# Patient Record
Sex: FEMALE | Race: WHITE | NOT HISPANIC OR LATINO | Employment: FULL TIME | URBAN - METROPOLITAN AREA
[De-identification: names, ages, dates, MRNs, and addresses within clinical notes are randomized per-mention and may not be internally consistent; named-entity substitution may affect disease eponyms.]

---

## 2017-12-27 ENCOUNTER — LAB VISIT (OUTPATIENT)
Dept: LAB | Facility: HOSPITAL | Age: 27
End: 2017-12-27
Attending: FAMILY MEDICINE
Payer: COMMERCIAL

## 2017-12-27 ENCOUNTER — PATIENT MESSAGE (OUTPATIENT)
Dept: FAMILY MEDICINE | Facility: CLINIC | Age: 27
End: 2017-12-27

## 2017-12-27 ENCOUNTER — OFFICE VISIT (OUTPATIENT)
Dept: FAMILY MEDICINE | Facility: CLINIC | Age: 27
End: 2017-12-27
Attending: FAMILY MEDICINE
Payer: COMMERCIAL

## 2017-12-27 VITALS
WEIGHT: 151.13 LBS | HEART RATE: 74 BPM | RESPIRATION RATE: 16 BRPM | DIASTOLIC BLOOD PRESSURE: 72 MMHG | BODY MASS INDEX: 22.9 KG/M2 | HEIGHT: 68 IN | OXYGEN SATURATION: 97 % | SYSTOLIC BLOOD PRESSURE: 120 MMHG

## 2017-12-27 DIAGNOSIS — Z00.00 LABORATORY EXAM ORDERED AS PART OF ROUTINE GENERAL MEDICAL EXAMINATION: ICD-10-CM

## 2017-12-27 DIAGNOSIS — Z30.41 ORAL CONTRACEPTIVE USE: ICD-10-CM

## 2017-12-27 DIAGNOSIS — Z00.00 ROUTINE GENERAL MEDICAL EXAMINATION AT A HEALTH CARE FACILITY: Primary | ICD-10-CM

## 2017-12-27 DIAGNOSIS — Z87.440 HISTORY OF RECURRENT UTI (URINARY TRACT INFECTION): ICD-10-CM

## 2017-12-27 LAB
ALBUMIN SERPL BCP-MCNC: 3.4 G/DL
ALP SERPL-CCNC: 37 U/L
ALT SERPL W/O P-5'-P-CCNC: 15 U/L
ANION GAP SERPL CALC-SCNC: 6 MMOL/L
AST SERPL-CCNC: 18 U/L
BASOPHILS # BLD AUTO: 0.02 K/UL
BASOPHILS NFR BLD: 0.3 %
BILIRUB SERPL-MCNC: 0.8 MG/DL
BUN SERPL-MCNC: 11 MG/DL
CALCIUM SERPL-MCNC: 9.2 MG/DL
CHLORIDE SERPL-SCNC: 108 MMOL/L
CHOLEST SERPL-MCNC: 193 MG/DL
CHOLEST/HDLC SERPL: 2.3 {RATIO}
CO2 SERPL-SCNC: 24 MMOL/L
CREAT SERPL-MCNC: 1 MG/DL
DIFFERENTIAL METHOD: NORMAL
EOSINOPHIL # BLD AUTO: 0.1 K/UL
EOSINOPHIL NFR BLD: 0.8 %
ERYTHROCYTE [DISTWIDTH] IN BLOOD BY AUTOMATED COUNT: 12 %
EST. GFR  (AFRICAN AMERICAN): >60 ML/MIN/1.73 M^2
EST. GFR  (NON AFRICAN AMERICAN): >60 ML/MIN/1.73 M^2
GLUCOSE SERPL-MCNC: 90 MG/DL
HCT VFR BLD AUTO: 38.9 %
HDLC SERPL-MCNC: 83 MG/DL
HDLC SERPL: 43 %
HGB BLD-MCNC: 13.4 G/DL
IMM GRANULOCYTES # BLD AUTO: 0.01 K/UL
IMM GRANULOCYTES NFR BLD AUTO: 0.2 %
LDLC SERPL CALC-MCNC: 90.6 MG/DL
LYMPHOCYTES # BLD AUTO: 2.6 K/UL
LYMPHOCYTES NFR BLD: 42.5 %
MCH RBC QN AUTO: 29.3 PG
MCHC RBC AUTO-ENTMCNC: 34.4 G/DL
MCV RBC AUTO: 85 FL
MONOCYTES # BLD AUTO: 0.4 K/UL
MONOCYTES NFR BLD: 7.1 %
NEUTROPHILS # BLD AUTO: 3 K/UL
NEUTROPHILS NFR BLD: 49.1 %
NONHDLC SERPL-MCNC: 110 MG/DL
NRBC BLD-RTO: 0 /100 WBC
PLATELET # BLD AUTO: 283 K/UL
PMV BLD AUTO: 10.2 FL
POTASSIUM SERPL-SCNC: 4.4 MMOL/L
PROT SERPL-MCNC: 6.9 G/DL
RBC # BLD AUTO: 4.57 M/UL
SODIUM SERPL-SCNC: 138 MMOL/L
T4 FREE SERPL-MCNC: 1.05 NG/DL
TRIGL SERPL-MCNC: 97 MG/DL
TSH SERPL DL<=0.005 MIU/L-ACNC: 1.64 UIU/ML
WBC # BLD AUTO: 6.02 K/UL

## 2017-12-27 PROCEDURE — 99999 PR PBB SHADOW E&M-NEW PATIENT-LVL III: CPT | Mod: PBBFAC,,, | Performed by: FAMILY MEDICINE

## 2017-12-27 PROCEDURE — 80061 LIPID PANEL: CPT

## 2017-12-27 PROCEDURE — 85025 COMPLETE CBC W/AUTO DIFF WBC: CPT

## 2017-12-27 PROCEDURE — 36415 COLL VENOUS BLD VENIPUNCTURE: CPT | Mod: PO

## 2017-12-27 PROCEDURE — 84439 ASSAY OF FREE THYROXINE: CPT

## 2017-12-27 PROCEDURE — 99385 PREV VISIT NEW AGE 18-39: CPT | Mod: S$GLB,,, | Performed by: FAMILY MEDICINE

## 2017-12-27 PROCEDURE — 80053 COMPREHEN METABOLIC PANEL: CPT

## 2017-12-27 PROCEDURE — 84443 ASSAY THYROID STIM HORMONE: CPT

## 2017-12-27 RX ORDER — ALPRAZOLAM 0.25 MG/1
0.25 TABLET ORAL 3 TIMES DAILY PRN
Qty: 10 TABLET | Refills: 0 | Status: SHIPPED | OUTPATIENT
Start: 2017-12-27 | End: 2020-12-13

## 2017-12-27 RX ORDER — NITROFURANTOIN 25; 75 MG/1; MG/1
1 CAPSULE ORAL DAILY PRN
COMMUNITY
Start: 2017-12-06 | End: 2020-12-13

## 2017-12-27 NOTE — PATIENT INSTRUCTIONS
Your test results will be communicated to you via : [facility], Telephone or Letter.   If you have not received your test results in one week, please contact the clinic at [phone].

## 2018-01-24 ENCOUNTER — TELEPHONE (OUTPATIENT)
Dept: FAMILY MEDICINE | Facility: CLINIC | Age: 28
End: 2018-01-24

## 2018-01-24 RX ORDER — SULFAMETHOXAZOLE AND TRIMETHOPRIM 800; 160 MG/1; MG/1
1 TABLET ORAL 2 TIMES DAILY
Qty: 20 TABLET | Refills: 0 | Status: SHIPPED | OUTPATIENT
Start: 2018-01-24 | End: 2020-12-13

## 2018-01-24 NOTE — TELEPHONE ENCOUNTER
----- Message from Apryl Heaton sent at 1/24/2018 12:02 PM CST -----  Contact: pt  x_  1st Request  _  2nd Request  _  3rd Request    Who: BREANNA WELLINGTON [[id number]]    Why: Patient would like to speak with staff in reference to a bump on her nose that may be a staff infection. Pt states she's getting  on Saturday and needs an appt ASAP. Offered a 2/1 appt but the pt is getting  on Saturday.     What Number to Call Back:[phone]    When to Expect a call back: (Within 24 hours)    Please return the call at earliest convenience. Thanks!

## 2018-05-10 ENCOUNTER — PATIENT MESSAGE (OUTPATIENT)
Dept: FAMILY MEDICINE | Facility: CLINIC | Age: 28
End: 2018-05-10

## 2019-02-06 ENCOUNTER — OFFICE VISIT (OUTPATIENT)
Dept: SPORTS MEDICINE | Facility: CLINIC | Age: 29
End: 2019-02-06
Payer: COMMERCIAL

## 2019-02-06 ENCOUNTER — HOSPITAL ENCOUNTER (OUTPATIENT)
Dept: RADIOLOGY | Facility: HOSPITAL | Age: 29
Discharge: HOME | End: 2019-02-06
Attending: ORTHOPAEDIC SURGERY
Payer: COMMERCIAL

## 2019-02-06 VITALS
HEIGHT: 68 IN | DIASTOLIC BLOOD PRESSURE: 84 MMHG | SYSTOLIC BLOOD PRESSURE: 128 MMHG | BODY MASS INDEX: 22.88 KG/M2 | HEART RATE: 73 BPM | WEIGHT: 151 LBS

## 2019-02-06 DIAGNOSIS — M25.562 PAIN IN BOTH KNEES, UNSPECIFIED CHRONICITY: Primary | ICD-10-CM

## 2019-02-06 DIAGNOSIS — M25.562 PAIN IN BOTH KNEES, UNSPECIFIED CHRONICITY: ICD-10-CM

## 2019-02-06 DIAGNOSIS — M25.561 PAIN IN BOTH KNEES, UNSPECIFIED CHRONICITY: Primary | ICD-10-CM

## 2019-02-06 DIAGNOSIS — M25.561 PAIN IN BOTH KNEES, UNSPECIFIED CHRONICITY: ICD-10-CM

## 2019-02-06 PROCEDURE — 73564 X-RAY EXAM KNEE 4 OR MORE: CPT | Mod: 26,50,, | Performed by: RADIOLOGY

## 2019-02-06 PROCEDURE — 99204 OFFICE O/P NEW MOD 45 MIN: CPT | Mod: S$GLB,,, | Performed by: ORTHOPAEDIC SURGERY

## 2019-02-06 PROCEDURE — 99999 PR PBB SHADOW E&M-EST. PATIENT-LVL III: CPT | Mod: PBBFAC,,, | Performed by: ORTHOPAEDIC SURGERY

## 2019-02-06 PROCEDURE — 73564 XR KNEE ORTHO BILAT WITH FLEXION: ICD-10-PCS | Mod: 26,50,, | Performed by: RADIOLOGY

## 2019-02-06 PROCEDURE — 73564 X-RAY EXAM KNEE 4 OR MORE: CPT | Mod: TC,50,FY,PO

## 2019-02-06 PROCEDURE — 99999 PR PBB SHADOW E&M-EST. PATIENT-LVL III: ICD-10-PCS | Mod: PBBFAC,,, | Performed by: ORTHOPAEDIC SURGERY

## 2019-02-06 PROCEDURE — 99204 PR OFFICE/OUTPT VISIT, NEW, LEVL IV, 45-59 MIN: ICD-10-PCS | Mod: S$GLB,,, | Performed by: ORTHOPAEDIC SURGERY

## 2019-02-06 NOTE — PROGRESS NOTES
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 On 2/3/19, she was skiiing in [place] when she fell going down a run and her ski did not dislodge, resulting a possible twisting injury to the left knee. She reports she was able to finish her run, but several minutes had an onset of pain and swelling. Since then, she has been taking ibuprofen and reports a 75% improvement in her symptoms.    - mechanical symptoms, no instability    Review of Systems   Constitution: Negative. Negative for chills, fever and night sweats.   HENT: Negative for congestion and headaches.    Eyes: Negative for blurred vision, left vision loss and right vision loss.   Cardiovascular: Negative for chest pain and syncope.   Respiratory: Negative for cough and shortness of breath.    Endocrine: Negative for polydipsia, polyphagia and polyuria.   Hematologic/Lymphatic: Negative for bleeding problem. Does not bruise/bleed easily.   Skin: Negative for dry skin, itching and rash.   Musculoskeletal: Negative for falls. Positive for knee pain and muscle weakness.   Gastrointestinal: Negative for abdominal pain and bowel incontinence.   Genitourinary: Negative for bladder incontinence and nocturia.   Neurological: Negative for disturbances in coordination, loss of balance and seizures.   Psychiatric/Behavioral: Negative for depression. The patient does not have insomnia.    Allergic/Immunologic: Negative for hives and persistent infections.     PAST MEDICAL HISTORY: History reviewed. No pertinent past medical history.  PAST SURGICAL HISTORY:   Past Surgical History:   Procedure Laterality Date    CERVICAL BIOPSY  W/ LOOP ELECTRODE EXCISION  08/2017     FAMILY HISTORY:   Family History   Problem Relation Age of Onset    No Known Problems Mother     Stroke Father         hemorhagic    Hypertension Father     Epilepsy Sister   "    SOCIAL HISTORY:   Social History     Socioeconomic History    Marital status:      Spouse name: Not on file    Number of children: 0    Years of education: Not on file    Highest education level: Not on file   Social Needs    Financial resource strain: Not on file    Food insecurity - worry: Not on file    Food insecurity - inability: Not on file    Transportation needs - medical: Not on file    Transportation needs - non-medical: Not on file   Occupational History    Occupation: Pharmacist     Comment: [employer]   Tobacco Use    Smoking status: Never Smoker   Substance and Sexual Activity    Alcohol use: Yes     Alcohol/week: 3.6 oz     Types: 4 Cans of beer, 2 Glasses of wine per week    Drug use: No    Sexual activity: Yes   Other Topics Concern    Not on file   Social History Narrative    The patient does exercise regularly (BIW-TIW: cardio).      She is satisfied with weight.    Rates diet as fair.    She does not drink at least 1/2 gallon water daily.    She drinks 2 coffee/tea/caffeine-containing soft drinks daily.    Total sleep time at night is 7 hours.    She works 41 hours per week.    She does wear seat belts.    Hobbies include none.       MEDICATIONS:   Current Outpatient Medications:     ALPRAZolam (XANAX) 0.25 MG tablet, Take 1 tablet (0.25 mg total) by mouth 3 (three) times daily as needed for Anxiety., Disp: 10 tablet, Rfl: 0    mupirocin (BACTROBAN) 2 % ointment, , Disp: , Rfl:     nitrofurantoin, macrocrystal-monohydrate, (MACROBID) 100 MG capsule, Take 1 capsule by mouth daily as needed., Disp: , Rfl:     OCELLA 3-0.03 mg per tablet, Take 1 tablet by mouth once daily., Disp: , Rfl: 11    sulfamethoxazole-trimethoprim 800-160mg (BACTRIM DS) 800-160 mg Tab, Take 1 tablet by mouth 2 (two) times daily., Disp: 20 tablet, Rfl: 0  ALLERGIES:   Review of patient's allergies indicates:   Allergen Reactions    Penicillins        VITAL SIGNS: /84   Pulse 73   Ht 5' 7.5" " "(1.715 m)   Wt 68.5 kg (151 lb)   BMI 23.30 kg/m²      PHYSICAL EXAMINATION  VITAL SIGNS: /84   Pulse 73   Ht 5' 7.5" (1.715 m)   Wt 68.5 kg (151 lb)   BMI 23.30 kg/m²    General:  The patient is alert and oriented x 3.  Mood is pleasant.  Observation of ears, eyes and nose reveal no gross abnormalities.  HEENT: NCAT, sclera nonicteric  Lungs: Respirations are equal and unlabored.    Left KNEE EXAMINATION     OBSERVATION / INSPECTION   Gait:   Antalgic   Alignment:  Neutral   Scars:   None   Muscle atrophy: None  Effusion:  Moderate  Warmth:  None   Discoloration:   none     TENDERNESS / CREPITUS (T / C):          T / C      T / C   Patella   - / -   Lateral joint line   - / -    Peripatellar medial  -  Medial joint line    - / -    Peripatellar lateral +  Medial plica   - / -    Patellar tendon -   Popliteal fossa   - / -    Quad tendon   +   Gastrocnemius   -   Prepatellar Bursa - / -   Quadricep   -   Tibial tubercle  -  Thigh/hamstring  -   Pes anserine/HS -  Fibula    -   ITB   - / -  Tibia     -   Tib/fib joint  - / -  LCL    -     MFC   - / -   MCL: Proximal  -    LFC   - / -    Distal   -          ROM: (* = pain)  PASSIVE   ACTIVE    Left :   5 / 10 / 75   5 / 5 / 65     Right :    5 / 0 / 145   5 / 0 / 145    Patellofemoral examination:  See above noted areas of tenderness.   Patella position    Subluxation / dislocation: Centered           Sup. / Inf;   Normal   Crepitus (PF):    Absent   Patellar Mobility:       Medial-lateral:   Normal    Superior-inferior:  Normal    Inferior tilt   Normal    Patellar tendon:  Normal   Lateral tilt:    Normal   J-sign:     None   Patellofemoral grind:   No pain       MENISCAL SIGNS:     Pain on terminal extension:  +  Pain on terminal flexion:  +  Abdullahis maneuver:  NT  Squat     NT    LIGAMENT EXAMINATION:  ACL / Lachman:  Grade 2B  PCL-Post.  drawer: normal 0 to 2mm  MCL- Valgus:  Grade I  LCL- Varus:  normal 0 to 2mm  Pivot shift: normal (Equal) "   Dial Test: difference c/w other side   At 30° flexion: normal (< 5°)    At 90° flexion: normal (< 5°)   Reverse Pivot Shift:   normal (Equal)     STRENGTH: (* = with pain) PAINFUL SIDE   Quadricep   5/5   Hamstrin/5    EXTREMITY NEURO-VASCULAR EXAMINATION:   Sensation:  Grossly intact to light touch all dermatomal regions.   Motor Function:  Fully intact motor function at hip, knee, foot and ankle    DTRs;  quadriceps and  achilles 2+.  No clonus and downgoing Babinski.    Vascular status:  DP and PT pulses 2+, brisk capillary refill, symmetric.     Other Findings:  Able to perform SLR without difficulty       X-rays:  including standing, weight bearing AP and flexion bilateral knees, lateral and merchant views ordered and images reviewed by me show:  No fracture, dislocation     ASSESSMENT:    Left Knee ACL tear  Left Knee MCL strain  +/- LMT    PLAN:   MRI Left knee  Short runner brace today  Continue ice and NSAID's  Continue working on ROM  If effusion is not improved by next Monday, will schedule patient for knee aspiration.    Will call after MRI is resulted  All questions were answered, pt will contact us for questions or concerns in the interim.

## 2019-02-07 ENCOUNTER — HOSPITAL ENCOUNTER (OUTPATIENT)
Dept: RADIOLOGY | Facility: HOSPITAL | Age: 29
Discharge: HOME | End: 2019-02-07
Attending: STUDENT IN AN ORGANIZED HEALTH CARE EDUCATION/TRAINING PROGRAM
Payer: COMMERCIAL

## 2019-02-07 DIAGNOSIS — M25.562 PAIN IN BOTH KNEES, UNSPECIFIED CHRONICITY: ICD-10-CM

## 2019-02-07 DIAGNOSIS — M25.561 PAIN IN BOTH KNEES, UNSPECIFIED CHRONICITY: ICD-10-CM

## 2019-02-07 PROCEDURE — 73721 MRI KNEE WITHOUT CONTRAST LEFT: ICD-10-PCS | Mod: 26,LT,, | Performed by: RADIOLOGY

## 2019-02-07 PROCEDURE — 73721 MRI JNT OF LWR EXTRE W/O DYE: CPT | Mod: 26,LT,, | Performed by: RADIOLOGY

## 2019-02-07 PROCEDURE — 73721 MRI JNT OF LWR EXTRE W/O DYE: CPT | Mod: TC,LT

## 2019-02-08 ENCOUNTER — TELEPHONE (OUTPATIENT)
Dept: SPORTS MEDICINE | Facility: CLINIC | Age: 29
End: 2019-02-08

## 2019-02-08 DIAGNOSIS — S82.122A CLOSED FRACTURE OF LATERAL PORTION OF LEFT TIBIAL PLATEAU, INITIAL ENCOUNTER: Primary | ICD-10-CM

## 2019-02-08 NOTE — TELEPHONE ENCOUNTER
Called patient to discuss MRI results.  MRI shows an ACL sprain (no tear).  It also shows a lateral tibial plateau with extensive subchondral edema and mild joint depression.    In order to better evaluate the joint depression, we will order a CT scan of the knee. If the joint depression is minimal, we can continue non-operative treatment (short-runner, NWB, ROM as tolerated). If she has joint depression, will potentially need ORIF.    Will call to schedule CT.  In the meantime, patient instructed to maintain NWB with crutches.

## 2019-02-08 NOTE — TELEPHONE ENCOUNTER
----- Message from Michael Joseph Casale, MD sent at 2/8/2019 10:09 AM CST -----  Katina Devine,    I called and spoke with this patient.  Can we get her schedule for a CT scan at her convenience? It is ordered.    Thank you!

## 2019-02-11 ENCOUNTER — HOSPITAL ENCOUNTER (OUTPATIENT)
Dept: RADIOLOGY | Facility: HOSPITAL | Age: 29
Discharge: HOME | End: 2019-02-11
Attending: STUDENT IN AN ORGANIZED HEALTH CARE EDUCATION/TRAINING PROGRAM
Payer: COMMERCIAL

## 2019-02-11 ENCOUNTER — TELEPHONE (OUTPATIENT)
Dept: SPORTS MEDICINE | Facility: CLINIC | Age: 29
End: 2019-02-11

## 2019-02-11 DIAGNOSIS — S82.122A CLOSED FRACTURE OF LATERAL PORTION OF LEFT TIBIAL PLATEAU, INITIAL ENCOUNTER: ICD-10-CM

## 2019-02-11 PROCEDURE — 73700 CT LOWER EXTREMITY W/O DYE: CPT | Mod: 26,LT,, | Performed by: INTERNAL MEDICINE

## 2019-02-11 PROCEDURE — 73700 CT LOWER EXTREMITY W/O DYE: CPT | Mod: TC,LT

## 2019-02-11 PROCEDURE — 73700 CT KNEE WITHOUT CONTRAST LEFT: ICD-10-PCS | Mod: 26,LT,, | Performed by: INTERNAL MEDICINE

## 2019-02-11 NOTE — TELEPHONE ENCOUNTER
Called patient to discuss CT scan results.  CT shows minimal joint depression and displacement.  We have elected to treat this fracture non-operatively.    Plan:  - NWB for at least 8 weeks from date of injury (2/3/19)  - Continue knee brace. Ok to remove and work on ROM as tolerated  - Continue ice and elevation daily  - NSAID's are OK for pain control. Patient declined anything stronger.  - Will refer to PT starting at 4 weeks post-injury  - Will see patient back in the clinic in 4 weeks with new x-rays.    Patient understands and is in agreement with this plan.

## 2019-02-11 NOTE — TELEPHONE ENCOUNTER
----- Message from Michael Joseph Casale, MD sent at 2/11/2019  5:01 PM CST -----  Please make this patient an appointment in 3 weeks for follow-up of tibial plateau fracture (will need new x-rays as well). Thank you!

## 2019-03-13 ENCOUNTER — HOSPITAL ENCOUNTER (OUTPATIENT)
Dept: RADIOLOGY | Facility: HOSPITAL | Age: 29
Discharge: HOME | End: 2019-03-13
Attending: ORTHOPAEDIC SURGERY
Payer: COMMERCIAL

## 2019-03-13 ENCOUNTER — OFFICE VISIT (OUTPATIENT)
Dept: SPORTS MEDICINE | Facility: CLINIC | Age: 29
End: 2019-03-13
Payer: COMMERCIAL

## 2019-03-13 VITALS
HEIGHT: 67 IN | BODY MASS INDEX: 23.7 KG/M2 | DIASTOLIC BLOOD PRESSURE: 74 MMHG | HEART RATE: 84 BPM | SYSTOLIC BLOOD PRESSURE: 129 MMHG | WEIGHT: 151 LBS

## 2019-03-13 DIAGNOSIS — M25.562 ACUTE PAIN OF LEFT KNEE: Primary | ICD-10-CM

## 2019-03-13 DIAGNOSIS — M25.562 LEFT KNEE PAIN, UNSPECIFIED CHRONICITY: ICD-10-CM

## 2019-03-13 PROCEDURE — 99214 OFFICE O/P EST MOD 30 MIN: CPT | Mod: S$GLB,,, | Performed by: ORTHOPAEDIC SURGERY

## 2019-03-13 PROCEDURE — 99999 PR PBB SHADOW E&M-EST. PATIENT-LVL IV: CPT | Mod: PBBFAC,,, | Performed by: ORTHOPAEDIC SURGERY

## 2019-03-13 PROCEDURE — 99214 PR OFFICE/OUTPT VISIT, EST, LEVL IV, 30-39 MIN: ICD-10-PCS | Mod: S$GLB,,, | Performed by: ORTHOPAEDIC SURGERY

## 2019-03-13 PROCEDURE — 99999 PR PBB SHADOW E&M-EST. PATIENT-LVL IV: ICD-10-PCS | Mod: PBBFAC,,, | Performed by: ORTHOPAEDIC SURGERY

## 2019-03-13 PROCEDURE — 73560 XR KNEE 1 OR 2 VIEW LEFT: ICD-10-PCS | Mod: 26,LT,, | Performed by: RADIOLOGY

## 2019-03-13 PROCEDURE — 73560 X-RAY EXAM OF KNEE 1 OR 2: CPT | Mod: 26,LT,, | Performed by: RADIOLOGY

## 2019-03-13 PROCEDURE — 73560 X-RAY EXAM OF KNEE 1 OR 2: CPT | Mod: TC,FY,PO,LT

## 2019-03-13 NOTE — PROGRESS NOTES
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 On 2/3/19, she was skiiing in [place] when she fell going down a run and her ski did not dislodge, resulting a possible twisting injury to the left knee. She reports she was able to finish her run, but several minutes had an onset of pain and swelling.     She notes that she only has small twinges of pain in certain positions but she is otherwise doing well   She has been compliant with her nonweightbearing status and wearing her brace     Review of Systems   Constitution: Negative. Negative for chills, fever and night sweats.   HENT: Negative for congestion and headaches.    Eyes: Negative for blurred vision, left vision loss and right vision loss.   Cardiovascular: Negative for chest pain and syncope.   Respiratory: Negative for cough and shortness of breath.    Endocrine: Negative for polydipsia, polyphagia and polyuria.   Hematologic/Lymphatic: Negative for bleeding problem. Does not bruise/bleed easily.   Skin: Negative for dry skin, itching and rash.   Musculoskeletal: Negative for falls. Positive for knee pain and muscle weakness.   Gastrointestinal: Negative for abdominal pain and bowel incontinence.   Genitourinary: Negative for bladder incontinence and nocturia.   Neurological: Negative for disturbances in coordination, loss of balance and seizures.   Psychiatric/Behavioral: Negative for depression. The patient does not have insomnia.    Allergic/Immunologic: Negative for hives and persistent infections.     PAST MEDICAL HISTORY: History reviewed. No pertinent past medical history.  PAST SURGICAL HISTORY:   Past Surgical History:   Procedure Laterality Date    CERVICAL BIOPSY  W/ LOOP ELECTRODE EXCISION  08/2017     FAMILY HISTORY:   Family History   Problem Relation Age of Onset    No Known Problems Mother     Stroke Father          hemorhagic    Hypertension Father     Epilepsy Sister      SOCIAL HISTORY:   Social History     Socioeconomic History    Marital status:      Spouse name: Not on file    Number of children: 0    Years of education: Not on file    Highest education level: Not on file   Social Needs    Financial resource strain: Not on file    Food insecurity - worry: Not on file    Food insecurity - inability: Not on file    Transportation needs - medical: Not on file    Transportation needs - non-medical: Not on file   Occupational History    Occupation: Pharmacist     Comment: [employer]   Tobacco Use    Smoking status: Never Smoker    Smokeless tobacco: Never Used   Substance and Sexual Activity    Alcohol use: Yes     Alcohol/week: 3.6 oz     Types: 2 Glasses of wine, 4 Cans of beer per week    Drug use: No    Sexual activity: Yes   Other Topics Concern    Not on file   Social History Narrative    The patient does exercise regularly (BIW-TIW: cardio).      She is satisfied with weight.    Rates diet as fair.    She does not drink at least 1/2 gallon water daily.    She drinks 2 coffee/tea/caffeine-containing soft drinks daily.    Total sleep time at night is 7 hours.    She works 41 hours per week.    She does wear seat belts.    Hobbies include none.       MEDICATIONS:   Current Outpatient Medications:     ALPRAZolam (XANAX) 0.25 MG tablet, Take 1 tablet (0.25 mg total) by mouth 3 (three) times daily as needed for Anxiety., Disp: 10 tablet, Rfl: 0    mupirocin (BACTROBAN) 2 % ointment, , Disp: , Rfl:     nitrofurantoin, macrocrystal-monohydrate, (MACROBID) 100 MG capsule, Take 1 capsule by mouth daily as needed., Disp: , Rfl:     OCELLA 3-0.03 mg per tablet, Take 1 tablet by mouth once daily., Disp: , Rfl: 11    sulfamethoxazole-trimethoprim 800-160mg (BACTRIM DS) 800-160 mg Tab, Take 1 tablet by mouth 2 (two) times daily., Disp: 20 tablet, Rfl: 0  ALLERGIES:   Review of patient's allergies indicates:  "  Allergen Reactions    Penicillins        VITAL SIGNS: /74   Pulse 84   Ht 5' 7" (1.702 m)   Wt 68.5 kg (151 lb)   BMI 23.65 kg/m²      PHYSICAL EXAMINATION  VITAL SIGNS: /74   Pulse 84   Ht 5' 7" (1.702 m)   Wt 68.5 kg (151 lb)   BMI 23.65 kg/m²    General:  The patient is alert and oriented x 3.  Mood is pleasant.  Observation of ears, eyes and nose reveal no gross abnormalities.  HEENT: NCAT, sclera nonicteric  Lungs: Respirations are equal and unlabored.    Left KNEE EXAMINATION     OBSERVATION / INSPECTION   Gait:   Antalgic, ambulating on crutches    Alignment:  Neutral   Scars:   None   Muscle atrophy: None  Effusion:  None  Warmth:  None   Discoloration:   none     TENDERNESS / CREPITUS (T / C):          T / C      T / C   Patella   - / -   Lateral joint line   - / -    Peripatellar medial  -  Medial joint line    - / -    Peripatellar lateral -  Medial plica   - / -    Patellar tendon -   Popliteal fossa   - / -    Quad tendon   -   Gastrocnemius   -   Prepatellar Bursa - / -   Quadricep   -   Tibial tubercle  -  Thigh/hamstring  -   Pes anserine/HS -  Fibula    -   ITB   - / -  Tibia     -   Tib/fib joint  - / -  LCL    -     MFC   - / -   MCL: Proximal  -    LFC   - / -    Distal   -          ROM: (* = pain)  PASSIVE   ACTIVE    Left :   5 / 0 / 120   5 / 0 / 120     Right :    5 / 0 / 145   5 / 0 / 145    Patellofemoral examination:  See above noted areas of tenderness.   Patella position    Subluxation / dislocation: Centered           Sup. / Inf;   Normal   Crepitus (PF):    Absent   Patellar Mobility:       Medial-lateral:   Normal    Superior-inferior:  Normal    Inferior tilt   Normal    Patellar tendon:  Normal   Lateral tilt:    Normal   J-sign:     None   Patellofemoral grind:   No pain       MENISCAL SIGNS:     Pain on terminal extension:  -  Pain on terminal flexion:  +  Abdullahis maneuver:  NT  Squat     NT    LIGAMENT EXAMINATION:  ACL / Lachman:  Grade 1A  PCL-Post.  " drawer: normal 0 to 2mm  MCL- Valgus:  Grade I  LCL- Varus:  normal 0 to 2mm  Pivot shift: normal (Equal)   Dial Test: difference c/w other side   At 30° flexion: normal (< 5°)    At 90° flexion: normal (< 5°)   Reverse Pivot Shift:   normal (Equal)     STRENGTH: (* = with pain) PAINFUL SIDE   Quadricep   5/5   Hamstrin/5    EXTREMITY NEURO-VASCULAR EXAMINATION:   Sensation:  Grossly intact to light touch all dermatomal regions.   Motor Function:  Fully intact motor function at hip, knee, foot and ankle    DTRs;  quadriceps and  achilles 2+.  No clonus and downgoing Babinski.    Vascular status:  DP and PT pulses 2+, brisk capillary refill, symmetric.     Other Findings:  Able to perform SLR without difficulty       X-rays:  including standing, weight bearing AP and flexion bilateral knees, lateral and merchant views ordered and images reviewed by me show:  No fracture, dislocation     CT:  Nondisplaced fracture at the anterolateral aspect the lateral tibial plateau with overlying soft tissue edema.  minimal joint depression and displacement    ASSESSMENT:    Nondisplaced tibial plateau fracture , healing     Plan:  - was NWB for at least 8 weeks from date of injury (2/3/19), PWB is ok now  - Continue knee brace. Ok to remove and work on ROM as tolerated  - Continue ice and elevation daily  - NSAID's are OK for pain control. Patient declined anything stronger.  - Begin PT  - Will see patient back in the clinic in 6 weeks with new x-rays.     Patient understands and is in agreement with this plan.

## 2019-03-18 ENCOUNTER — CLINICAL SUPPORT (OUTPATIENT)
Dept: REHABILITATION | Facility: HOSPITAL | Age: 29
End: 2019-03-18
Attending: ORTHOPAEDIC SURGERY
Payer: COMMERCIAL

## 2019-03-18 DIAGNOSIS — M25.562 LEFT KNEE PAIN, UNSPECIFIED CHRONICITY: ICD-10-CM

## 2019-03-18 PROCEDURE — 97161 PT EVAL LOW COMPLEX 20 MIN: CPT

## 2019-03-18 PROCEDURE — 97110 THERAPEUTIC EXERCISES: CPT

## 2019-03-18 NOTE — PLAN OF CARE
[facility]  Physical Therapy Initial Evaluation    Name: Meghan Fontana  Clinic Number: [id number]    Therapy Diagnosis:   Encounter Diagnosis   Name Primary?    Left knee pain, unspecified chronicity      Physician: Emely Dixon MD    Physician Orders: PT Eval and Treat   Medical Diagnosis: M25.562 (ICD-10-CM) - Acute pain of left knee  Date of Surgery: NA    Evaluation Date: 3/18/2019  Authorization Period Expiration: 12/31/2019  Plan of Care Certification Period: 7/31/2019  Visit # / Visits authorized: 1/ 20    Time In: 1300  Time Out: 1400  Total Billable Time: 60 minutes    Precautions: Standard    Subjective   Date of onset: 2/3/2019  History of current condition - Meghan reports to PT with (L) knee pain since a skiiing accident on 2/3/2019. X ray revealed a Tibial Plateau fracture for which she has been NWB. Pt has been compliant with her WB precautions and notes that she is progressing. Denies numbness/tingling at this time.       No past medical history on file.  Meghan Fontana  has a past surgical history that includes Cervical biopsy w/ loop electrode excision (08/2017).    Meghan has a current medication list which includes the following prescription(s): alprazolam, mupirocin, nitrofurantoin (macrocrystal-monohydrate), ocella, and sulfamethoxazole-trimethoprim 800-160mg.    Review of patient's allergies indicates:   Allergen Reactions    Penicillins         Imaging, Left: No fracture dislocation bone destruction or OCD seen.    Prior Therapy: None   Occupation: Pharmacist: Currently Working  Prior Level of Function: Independent without restriction  Current Level of Function: Pain in the AM    Pain:  Current 3/10, worst 3/10, best 2/10   Location: left knee   Description: Aching and Tight  Aggravating Factors: Standing, Extension and Getting out of bed/chair  Easing Factors: rest    Pts goals: Return to skiiing    Objective     Observation: Pt enters wearing knee  brace and using bilateral axillary crutches    Posture: Flexed knee pattern      Gait: NWB    Range of Motion: AROM (PROM):    Knee Left Right   Extension 0 (7) degrees 0 (10) degrees   Flexion 140 (145) degrees 135 (140) degrees       Strength:  Hip Left Right   Flexion 4-/5 4+/5   Abduction 4-/5 4+/5   Adduction 4-/5 4+/5   Extension 4-/5 4+/5     Knee Left Right   Extension 3+/5 5/5   Flexion 3+/5 5/5     Special Tests:    Knee Left   ACL/PCL stress Negative   MCL/LCL stress Negative   End range extension Positive     Joint Mobility:   - Hip WNL    Palpation: (+) TTP  - Lateral JL  - Neurolymphatic bundle (L) Knee    Flexibility:   (+) [place] on (L)          CMS Impairment/Limitation/Restriction for FOTO Knee Survey    Therapist reviewed FOTO scores for Meghan Fontana on 3/18/2019.   FOTO documents entered into [employer] - see Media section.    Limitation Score: 48%  Category: Mobility    Current : CK = at least 40% but < 60% impaired, limited or restricted  Goal: CJ = at least 20% but < 40% impaired, limited or restricted  Discharge:          TREATMENT   Treatment Time In: 1340  Treatment Time Out: 1400  Total Treatment time separate from Evaluation time:20    Meghan received therapeutic exercises to develop strength, endurance, ROM, flexibility and posture for 20 minutes including:  - Quad set  - 3 way SLR  - Prone quad stretch  - Bike x 6 min  - Stair training      Home Exercises and Patient Education Provided    Education provided re: HEP, Dx, POC    Written Home Exercises Provided: .  Exercises were reviewed and Meghan was able to demonstrate them prior to the end of the session.   Pt received a written copy of exercises to perform at home. Meghan demonstrated good  understanding of the education provided.     See EMR under patient instructions for exercises given.   Assessment   Meghan is a 28 y.o. female referred to outpatient Physical Therapy with a medical diagnosis of (L) knee Lateral Tibial Plateau  Fracture. Pt presents with primary impairments including, ROM, strength, gait, balance and pain limiting functional mobility. Pt will remain NWB x 2 additional weeks and will then transition into closed chain activity. She is an excellent candidate for skilled PT tx and should progress nicely with rehabilitation.    Pt prognosis is Excellent.   Pt will benefit from skilled outpatient Physical Therapy to address the deficits stated above and in the chart below, provide pt/family education, and to maximize pt's level of independence.     Plan of care discussed with patient: Yes  Pt's spiritual, cultural and educational needs considered and patient is agreeable to the plan of care and goals as stated below:     Anticipated Barriers for therapy: None    Medical Necessity is demonstrated by the following  History  Co-morbidities and personal factors that may impact the plan of care Co-morbidities:   See Above    Personal Factors:   no deficits     low   Examination  Body Structures and Functions, activity limitations and participation restrictions that may impact the plan of care Body Regions:   lower extremities    Body Systems:    ROM  strength  balance  gait  transfers  edema    Participation Restrictions:   Walking, Squatting, Stairs    Activity limitations:   Learning and applying knowledge  no deficits    Mobility  lifting and carrying objects  walking    Self care  no deficits    Domestic Life  doing house work (cleaning house, washing dishes, laundry)    Life Areas  no deficits    Community and Social Life  recreation and leisure         low   Clinical Presentation stable and uncomplicated low   Decision Making/ Complexity Score: low     Goals:    Short Term Goals (4-6 Weeks):  - Pt will increase (L) knee ROM to match the contralateral side for improved gait mechanics  - Pt will increase (L) quad strength to > 4/5 for stability with standing ADL's  - Decrease gross Pain to < 3/10 with home ambulation without  AD  - Pt independent with HEP to improve tolerance to exercise progressions.     Long Term Goals (8-12 Weeks):    - Pt will increase LLE strength to 5/5 in all planes for stability with return to gym activity  - Decrease gross Pain to 0/10 with community ambulation   - Pt will report improvement in overall functional abilities, evidenced by improved score on  FOTO to 20% limitation or better.         Plan   Certification Period/Plan of care expiration: 3/18/2019 to 7/31/2019.    Outpatient Physical Therapy 1-2 times weekly for 14 weeks to include the following interventions: Manual Therapy, Moist Heat/ Ice, Neuromuscular Re-ed, Patient Education, Therapeutic Activites and Therapeutic Exercise.     Jossue Jovel, PT, DPT, OCS

## 2019-03-27 ENCOUNTER — CLINICAL SUPPORT (OUTPATIENT)
Dept: REHABILITATION | Facility: HOSPITAL | Age: 29
End: 2019-03-27
Attending: FAMILY MEDICINE
Payer: COMMERCIAL

## 2019-03-27 DIAGNOSIS — M25.562 LEFT KNEE PAIN, UNSPECIFIED CHRONICITY: ICD-10-CM

## 2019-03-27 PROCEDURE — 97110 THERAPEUTIC EXERCISES: CPT

## 2019-03-27 NOTE — PROGRESS NOTES
"                            Physical Therapy Daily Treatment Note     Name: Meghan Flores [place]  Clinic Number: [id number]    Therapy Diagnosis:   Encounter Diagnosis   Name Primary?    Left knee pain, unspecified chronicity      Physician: Emely Dixon MD    Visit Date: 3/27/2019  Physician Orders: PT Eval and Treat   Medical Diagnosis: M25.562 (ICD-10-CM) - Acute pain of left knee  Date of Surgery: NA     Evaluation Date: 3/18/2019  Authorization Period Expiration: 12/31/2019  Plan of Care Certification Period: 7/31/2019  Visit # / Visits authorized: 2/ 20    Time In: 0900  Time Out: 0945  Total Billable Time: 45 minutes    Precautions: Standard: NWB    Subjective      Pt reports: she was compliant with home exercise program given last session.   Response to previous treatment:Good  Functional change: NC    Pain: 0/10  Location: left knee      Objective     Meghan received therapeutic exercises to develop strength, ROM, flexibility and core stabilization for 45 minutes including:  - Bike x 8 min  - QS  10x10"  - SLR  - Bridges  - Hollow hold with hamstring ext  - SL hip Abd  - Prone hip ext  - Standing weight shift  - Rolling pin adductor bundle          Assessment     Mild discomfort along the medial portion of the knee, but  No pain along the plateau. Pt progressing appropriately at this time.  Meghan is progressing well towards her goals.   Pt prognosis is Excellent.     Pt will continue to benefit from skilled outpatient physical therapy to address the deficits listed in the problem list box on initial evaluation, provide pt/family education and to maximize pt's level of independence in the home and community environment.     Pt's spiritual, cultural and educational needs considered and pt agreeable to plan of care and goals.        Plan     Continue POC. Progress to closed chain activity once WB restriction is lifted.    Jossue Jovel, PT , DPT, OCS  "

## 2019-04-03 ENCOUNTER — CLINICAL SUPPORT (OUTPATIENT)
Dept: REHABILITATION | Facility: HOSPITAL | Age: 29
End: 2019-04-03
Attending: ORTHOPAEDIC SURGERY
Payer: COMMERCIAL

## 2019-04-03 DIAGNOSIS — M25.562 LEFT KNEE PAIN, UNSPECIFIED CHRONICITY: ICD-10-CM

## 2019-04-03 PROCEDURE — 97110 THERAPEUTIC EXERCISES: CPT

## 2019-04-03 NOTE — PROGRESS NOTES
"                            Physical Therapy Daily Treatment Note     Name: Meghan Flores [place]  Clinic Number: [id number]    Therapy Diagnosis:   Encounter Diagnosis   Name Primary?    Left knee pain, unspecified chronicity      Physician: Emely Dixon MD    Visit Date: 4/3/2019  Physician Orders: PT Eval and Treat   Medical Diagnosis: M25.562 (ICD-10-CM) - Acute pain of left knee  Date of Surgery: NA     Evaluation Date: 3/18/2019  Authorization Period Expiration: 12/31/2019  Plan of Care Certification Period: 7/31/2019  Visit # / Visits authorized: 2/ 20    Time In: 0810  Time Out: 0900  Total Billable Time: 45 minutes    Precautions: Standard: FWB    Subjective      Pt reports: she was compliant with home exercise program given last session. No pain at this time and has discharged crutches.  Response to previous treatment:Good  Functional change: NC    Pain: 0/10  Location: left knee      Objective     Meghan received therapeutic exercises to develop strength, ROM, flexibility and core stabilization for 45 minutes including:  - Bike x 8 min  - QS  10x10"  - SLR x 20  - Bridges x 20  - Hollow hold with hamstring ext  - SL hip Abd OTB 10x5"  - Prone hip ext  - Resisted side step BTB  - Shuttle press 2 bands 3x15 DL  - Shuttle press 2 bands 1x30 SL          Assessment     No pain with today's activity. She does present with quad atrophy/weakness resulting in a mild quad aversion gait pattern. Appropriate to progress as tolerated.  Meghan is progressing well towards her goals.   Pt prognosis is Excellent.     Pt will continue to benefit from skilled outpatient physical therapy to address the deficits listed in the problem list box on initial evaluation, provide pt/family education and to maximize pt's level of independence in the home and community environment.     Pt's spiritual, cultural and educational needs considered and pt agreeable to plan of care and goals.        Plan     Continue POC. Progress to closed " chain activity once WB restriction is lifted.    Jossue Jovel, PT , DPT, OCS

## 2019-04-08 ENCOUNTER — HOSPITAL ENCOUNTER (OUTPATIENT)
Dept: RADIOLOGY | Facility: HOSPITAL | Age: 29
Discharge: HOME | End: 2019-04-08
Attending: ORTHOPAEDIC SURGERY
Payer: COMMERCIAL

## 2019-04-08 ENCOUNTER — OFFICE VISIT (OUTPATIENT)
Dept: SPORTS MEDICINE | Facility: CLINIC | Age: 29
End: 2019-04-08
Payer: COMMERCIAL

## 2019-04-08 VITALS
HEART RATE: 70 BPM | DIASTOLIC BLOOD PRESSURE: 83 MMHG | WEIGHT: 151 LBS | SYSTOLIC BLOOD PRESSURE: 123 MMHG | HEIGHT: 67 IN | BODY MASS INDEX: 23.7 KG/M2

## 2019-04-08 DIAGNOSIS — M25.562 ACUTE PAIN OF LEFT KNEE: Primary | ICD-10-CM

## 2019-04-08 DIAGNOSIS — M25.562 LEFT KNEE PAIN, UNSPECIFIED CHRONICITY: ICD-10-CM

## 2019-04-08 PROCEDURE — 99999 PR PBB SHADOW E&M-EST. PATIENT-LVL III: CPT | Mod: PBBFAC,,, | Performed by: ORTHOPAEDIC SURGERY

## 2019-04-08 PROCEDURE — 73560 X-RAY EXAM OF KNEE 1 OR 2: CPT | Mod: TC,FY,PO,LT

## 2019-04-08 PROCEDURE — 99214 PR OFFICE/OUTPT VISIT, EST, LEVL IV, 30-39 MIN: ICD-10-PCS | Mod: S$GLB,,, | Performed by: ORTHOPAEDIC SURGERY

## 2019-04-08 PROCEDURE — 73560 X-RAY EXAM OF KNEE 1 OR 2: CPT | Mod: 26,LT,, | Performed by: RADIOLOGY

## 2019-04-08 PROCEDURE — 99999 PR PBB SHADOW E&M-EST. PATIENT-LVL III: ICD-10-PCS | Mod: PBBFAC,,, | Performed by: ORTHOPAEDIC SURGERY

## 2019-04-08 PROCEDURE — 73560 XR KNEE 1 OR 2 VIEW LEFT: ICD-10-PCS | Mod: 26,LT,, | Performed by: RADIOLOGY

## 2019-04-08 PROCEDURE — 99214 OFFICE O/P EST MOD 30 MIN: CPT | Mod: S$GLB,,, | Performed by: ORTHOPAEDIC SURGERY

## 2019-04-08 NOTE — PROGRESS NOTES
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 On 2/3/19, she was skiiing in [place] when she fell going down a run and her ski did not dislodge, resulting a possible twisting injury to the left knee. She reports she was able to finish her run, but several minutes had an onset of pain and swelling.     Patient has been attending physical therapy at the [facility] location, working with Jossue Jovel.  Patient notes that she is doing well, she denies any issues complaints or concerns     She also notes no pain in her knee but soreness in her leg just from progressing her weight bearing, etc.       Review of Systems   Constitution: Negative. Negative for chills, fever and night sweats.   HENT: Negative for congestion and headaches.    Eyes: Negative for blurred vision, left vision loss and right vision loss.   Cardiovascular: Negative for chest pain and syncope.   Respiratory: Negative for cough and shortness of breath.    Endocrine: Negative for polydipsia, polyphagia and polyuria.   Hematologic/Lymphatic: Negative for bleeding problem. Does not bruise/bleed easily.   Skin: Negative for dry skin, itching and rash.   Musculoskeletal: Negative for falls. Positive for knee pain and muscle weakness.   Gastrointestinal: Negative for abdominal pain and bowel incontinence.   Genitourinary: Negative for bladder incontinence and nocturia.   Neurological: Negative for disturbances in coordination, loss of balance and seizures.   Psychiatric/Behavioral: Negative for depression. The patient does not have insomnia.    Allergic/Immunologic: Negative for hives and persistent infections.     PAST MEDICAL HISTORY: History reviewed. No pertinent past medical history.  PAST SURGICAL HISTORY:   Past Surgical History:   Procedure Laterality Date    CERVICAL BIOPSY  W/ LOOP ELECTRODE EXCISION  08/2017     FAMILY  HISTORY:   Family History   Problem Relation Age of Onset    No Known Problems Mother     Stroke Father         hemorhagic    Hypertension Father     Epilepsy Sister      SOCIAL HISTORY:   Social History     Socioeconomic History    Marital status:      Spouse name: Not on file    Number of children: 0    Years of education: Not on file    Highest education level: Not on file   Occupational History    Occupation: Pharmacist     Comment: [employer]   Social Needs    Financial resource strain: Not on file    Food insecurity:     Worry: Not on file     Inability: Not on file    Transportation needs:     Medical: Not on file     Non-medical: Not on file   Tobacco Use    Smoking status: Never Smoker    Smokeless tobacco: Never Used   Substance and Sexual Activity    Alcohol use: Yes     Alcohol/week: 3.6 oz     Types: 2 Glasses of wine, 4 Cans of beer per week    Drug use: No    Sexual activity: Yes   Lifestyle    Physical activity:     Days per week: Not on file     Minutes per session: Not on file    Stress: Not on file   Relationships    Social connections:     Talks on phone: Not on file     Gets together: Not on file     Attends Rastafari service: Not on file     Active member of club or organization: Not on file     Attends meetings of clubs or organizations: Not on file     Relationship status: Not on file   Other Topics Concern    Not on file   Social History Narrative    The patient does exercise regularly (BIW-TIW: cardio).      She is satisfied with weight.    Rates diet as fair.    She does not drink at least 1/2 gallon water daily.    She drinks 2 coffee/tea/caffeine-containing soft drinks daily.    Total sleep time at night is 7 hours.    She works 41 hours per week.    She does wear seat belts.    Hobbies include none.       MEDICATIONS:   Current Outpatient Medications:     ALPRAZolam (XANAX) 0.25 MG tablet, Take 1 tablet (0.25 mg total) by mouth 3 (three) times daily as needed for  "Anxiety., Disp: 10 tablet, Rfl: 0    mupirocin (BACTROBAN) 2 % ointment, , Disp: , Rfl:     nitrofurantoin, macrocrystal-monohydrate, (MACROBID) 100 MG capsule, Take 1 capsule by mouth daily as needed., Disp: , Rfl:     OCELLA 3-0.03 mg per tablet, Take 1 tablet by mouth once daily., Disp: , Rfl: 11    sulfamethoxazole-trimethoprim 800-160mg (BACTRIM DS) 800-160 mg Tab, Take 1 tablet by mouth 2 (two) times daily., Disp: 20 tablet, Rfl: 0  ALLERGIES:   Review of patient's allergies indicates:   Allergen Reactions    Penicillins        VITAL SIGNS: /83   Pulse 70   Ht 5' 7" (1.702 m)   Wt 68.5 kg (151 lb)   BMI 23.65 kg/m²      PHYSICAL EXAMINATION  VITAL SIGNS: /83   Pulse 70   Ht 5' 7" (1.702 m)   Wt 68.5 kg (151 lb)   BMI 23.65 kg/m²    General:  The patient is alert and oriented x 3.  Mood is pleasant.  Observation of ears, eyes and nose reveal no gross abnormalities.  HEENT: NCAT, sclera nonicteric  Lungs: Respirations are equal and unlabored.    Left KNEE EXAMINATION     OBSERVATION / INSPECTION   Gait:   nonantalgic    Alignment:  Neutral   Scars:   None   Muscle atrophy: Mild  Effusion:  None  Warmth:  None   Discoloration:   none     TENDERNESS / CREPITUS (T / C):          T / C      T / C   Patella   - / -   Lateral joint line   - / -    Peripatellar medial  -  Medial joint line    - / -    Peripatellar lateral -  Medial plica   - / -    Patellar tendon -   Popliteal fossa   - / -    Quad tendon   -   Gastrocnemius   -   Prepatellar Bursa - / -   Quadricep   -   Tibial tubercle  -  Thigh/hamstring  -   Pes anserine/HS -  Fibula    -   ITB   - / -  Tibia     -   Tib/fib joint  - / -  LCL    -     MFC   - / -   MCL: Proximal  -    LFC   - / -    Distal   -          ROM: (* = pain)  PASSIVE   ACTIVE    Left :   5 / 0 / 145   5 / 0 / 145     Right :    5 / 0 / 145   5 / 0 / 145    Patellofemoral examination:  See above noted areas of tenderness.   Patella position    Subluxation / " dislocation: Centered           Sup. / Inf;   Normal   Crepitus (PF):    Absent   Patellar Mobility:       Medial-lateral:   Normal    Superior-inferior:  Normal    Inferior tilt   Normal    Patellar tendon:  Normal   Lateral tilt:    Normal   J-sign:     None   Patellofemoral grind:   No pain       MENISCAL SIGNS:     Pain on terminal extension:  -  Pain on terminal flexion:  -  Abdullahis maneuver:  NT  Squat     NT    LIGAMENT EXAMINATION:  ACL / Lachman:  Grade 1A  PCL-Post.  drawer: normal 0 to 2mm  MCL- Valgus:  Grade I  LCL- Varus:  normal 0 to 2mm  Pivot shift: normal (Equal)   Dial Test: difference c/w other side   At 30° flexion: normal (< 5°)    At 90° flexion: normal (< 5°)   Reverse Pivot Shift:   normal (Equal)     STRENGTH: (* = with pain) PAINFUL SIDE   Quadricep   5/5   Hamstrin/5    EXTREMITY NEURO-VASCULAR EXAMINATION:   Sensation:  Grossly intact to light touch all dermatomal regions.   Motor Function:  Fully intact motor function at hip, knee, foot and ankle    DTRs;  quadriceps and  achilles 2+.  No clonus and downgoing Babinski.    Vascular status:  DP and PT pulses 2+, brisk capillary refill, symmetric.     Other Findings:       X-rays:  including standing, weight bearing AP and flexion bilateral knees, lateral and merchant views ordered and images reviewed by me show:   Nondisplaced tibial plateau fracture , healed     CT:  Nondisplaced fracture at the anterolateral aspect the lateral tibial plateau with overlying soft tissue edema.  minimal joint depression and displacement    ASSESSMENT:    Nondisplaced tibial plateau fracture , healed     Plan:    Progress HEP with Noemi Jovel, we will have him progress her through running once she is ready to get back into running   Follow up as needed   All questions were answered, pt will contact us for questions or concerns in the interim.

## 2019-04-09 ENCOUNTER — CLINICAL SUPPORT (OUTPATIENT)
Dept: REHABILITATION | Facility: HOSPITAL | Age: 29
End: 2019-04-09
Attending: ORTHOPAEDIC SURGERY
Payer: COMMERCIAL

## 2019-04-09 DIAGNOSIS — M25.562 ACUTE PAIN OF LEFT KNEE: ICD-10-CM

## 2019-04-09 PROCEDURE — 97110 THERAPEUTIC EXERCISES: CPT

## 2019-04-09 NOTE — PROGRESS NOTES
"                            Physical Therapy Daily Treatment Note     Name: Meghan Flores [place]  Clinic Number: [id number]    Therapy Diagnosis:   Encounter Diagnosis   Name Primary?    Acute pain of left knee      Physician: Emely Dixon MD    Visit Date: 4/9/2019  Physician Orders: PT Eval and Treat   Medical Diagnosis: M25.562 (ICD-10-CM) - Acute pain of left knee  Date of Surgery: NA     Evaluation Date: 3/18/2019  Authorization Period Expiration: 12/31/2019  Plan of Care Certification Period: 7/31/2019  Visit # / Visits authorized: 4/ 20    Time In: 0800  Time Out: 0845  Total Billable Time: 45 minutes    Precautions: Standard: FWB    Subjective      Pt reports she is feling good and had a good follow up with Dr. Dixon. Notes that she had some calf soreness with stair activity but otherwise feels normal.  Response to previous treatment:Good  Functional change: NC    Pain: 0/10  Location: left knee      Objective     Meghan received therapeutic exercises to develop strength, ROM, flexibility and core stabilization for 45 minutes including:  - Bike x 8 min  - QS  10x10"  - SLR x 20  - Bridges x 20  - Hollow hold with hamstring ext  - SL hip Abd OTB 10x5"  - Prone hip ext  - Resisted side step BTB  - Shuttle press 2 bands 3x15 DL  - Shuttle press 1 bands 1x30 SL with DD  - Vector lunges x 10          Assessment     Fatigue and no pain. Plan to continue to see throughout this week then pt will be appropriate for decreased frequency.  Meghan is progressing well towards her goals.   Pt prognosis is Excellent.     Pt will continue to benefit from skilled outpatient physical therapy to address the deficits listed in the problem list box on initial evaluation, provide pt/family education and to maximize pt's level of independence in the home and community environment.     Pt's spiritual, cultural and educational needs considered and pt agreeable to plan of care and goals.        Plan     Continue POC.     Jossue Jovel PT , " DPT, OCS

## 2019-04-11 ENCOUNTER — CLINICAL SUPPORT (OUTPATIENT)
Dept: REHABILITATION | Facility: HOSPITAL | Age: 29
End: 2019-04-11
Attending: ORTHOPAEDIC SURGERY
Payer: COMMERCIAL

## 2019-04-11 DIAGNOSIS — M25.562 ACUTE PAIN OF LEFT KNEE: ICD-10-CM

## 2019-04-11 PROCEDURE — 97110 THERAPEUTIC EXERCISES: CPT

## 2019-04-11 NOTE — PROGRESS NOTES
"                            Physical Therapy Daily Treatment Note     Name: Meghan Flores [place]  Clinic Number: [id number]    Therapy Diagnosis:   Encounter Diagnosis   Name Primary?    Acute pain of left knee      Physician: Emely Dixon MD    Visit Date: 4/11/2019  Physician Orders: PT Eval and Treat   Medical Diagnosis: M25.562 (ICD-10-CM) - Acute pain of left knee  Date of Surgery: NA     Evaluation Date: 3/18/2019  Authorization Period Expiration: 12/31/2019  Plan of Care Certification Period: 7/31/2019  Visit # / Visits authorized: 5/ 20    Time In: 1300  Time Out: 1400  Total Billable Time: 40 minutes    Precautions: Standard: FWB    Subjective      Pt states feeling well w/ no c/o pn in L LE.    Response to previous treatment:no adverse effects   Functional change: no change    Pain: 0/10  Location: left knee      Objective   FOTO: 4/11/19  34%v limitation     Meghan received therapeutic exercises to develop strength, ROM, flexibility and core stabilization for 40 minutes including:  - Bike x 5 min to increase blood flow   - Bridges x 30  Mini squats 3 x 10   - Resisted side step BTB 1 lap   - Shuttle press 2 bands 3x15 DL  - Shuttle press 1 bands 1x30 SL with DD  - Vector lunges 2 x 10   - QS  10x10"  - SLR x 30 x   - SL hip Abd OTB 2 x 15   - Prone hip ext 2 x 15       Not performed today:   - Hollow hold with hamstring ext      Assessment   Pt showed increased muscular endurance during therex.  Pt cont to lack some hip and quad strength.  Pt sophia tx well w/ no c/o pn.      Meghan is progressing well towards her goals.   Pt prognosis is Excellent.     Pt will continue to benefit from skilled outpatient physical therapy to address the deficits listed in the problem list box on initial evaluation, provide pt/family education and to maximize pt's level of independence in the home and community environment.     Pt's spiritual, cultural and educational needs considered and pt agreeable to plan of care and " goals.        Plan     Cont to progress towards goals set by PT.  Work to increase quad and hip strength during therex.

## 2019-04-23 ENCOUNTER — CLINICAL SUPPORT (OUTPATIENT)
Dept: REHABILITATION | Facility: HOSPITAL | Age: 29
End: 2019-04-23
Attending: ORTHOPAEDIC SURGERY
Payer: COMMERCIAL

## 2019-04-23 DIAGNOSIS — M25.562 ACUTE PAIN OF LEFT KNEE: ICD-10-CM

## 2019-04-23 PROCEDURE — 97110 THERAPEUTIC EXERCISES: CPT

## 2019-04-23 NOTE — PROGRESS NOTES
"                            Physical Therapy Daily Treatment Note     Name: Meghan Flores [place]  Clinic Number: [id number]    Therapy Diagnosis:   Encounter Diagnosis   Name Primary?    Acute pain of left knee      Physician: Emely Dixon MD    Visit Date: 4/23/2019  Physician Orders: PT Eval and Treat   Medical Diagnosis: M25.562 (ICD-10-CM) - Acute pain of left knee  Date of Surgery: NA     Evaluation Date: 3/18/2019  Authorization Period Expiration: 12/31/2019  Plan of Care Certification Period: 7/31/2019  Visit # / Visits authorized: 6/ 20    Time In: 0807  Time Out: 856  Total Billable Time: 25 minutes    Precautions: Standard: FWB    Subjective      Pt reports w/ no c/o pn in L knee.    Response to previous treatment:no adverse effects   Functional change: no change    Pain: 0/10  Location: left knee      Objective   FOTO: 4/11/19  34%v limitation     Meghan received therapeutic exercises to develop strength, ROM, flexibility and core stabilization for 25 minutes including:  - Bike x 5 min to increase blood flow   - Bridges x 30 with otb   Mini squats 3 x 10 TRX  - Resisted side step BTB 1 lap   - Shuttle press SL 2 band s and versa disc 3 x 10  - Vector lunges 3 x 10  - SLR x 3 x 10 2#         Not performed today:   - Hollow hold with hamstring ext  - SL hip Abd OTB 2 x 15   - Prone hip ext 2 x 15   - QS  10x10"  - Shuttle press 2 bands 3x15 DL    Assessment     Pt had no adverse effects from tx.  Pt demonstrated increased strength and endurance during therex.    Meghan is progressing well towards her goals.   Pt prognosis is Excellent.     Pt will continue to benefit from skilled outpatient physical therapy to address the deficits listed in the problem list box on initial evaluation, provide pt/family education and to maximize pt's level of independence in the home and community environment.     Pt's spiritual, cultural and educational needs considered and pt agreeable to plan of care and goals.        Plan "     Cont to progress towards goals set by PT.  Cont to improve balance and strength next visit.      Vicente Gill, PTA

## 2019-05-07 ENCOUNTER — CLINICAL SUPPORT (OUTPATIENT)
Dept: REHABILITATION | Facility: HOSPITAL | Age: 29
End: 2019-05-07
Attending: ORTHOPAEDIC SURGERY
Payer: COMMERCIAL

## 2019-05-07 DIAGNOSIS — M25.562 ACUTE PAIN OF LEFT KNEE: ICD-10-CM

## 2019-05-07 PROCEDURE — 97110 THERAPEUTIC EXERCISES: CPT

## 2019-05-07 NOTE — PROGRESS NOTES
"                            Physical Therapy Daily Treatment Note     Name: Meghan Flores [place]  Clinic Number: [id number]    Therapy Diagnosis:   Encounter Diagnosis   Name Primary?    Acute pain of left knee      Physician: Emely Dixon MD    Visit Date: 5/7/2019  Physician Orders: PT Eval and Treat   Medical Diagnosis: M25.562 (ICD-10-CM) - Acute pain of left knee  Date of Surgery: NA     Evaluation Date: 3/18/2019  Authorization Period Expiration: 12/31/2019  Plan of Care Certification Period: 7/31/2019  Visit # / Visits authorized: 7/ 20    Time In: 1400  Time Out: 1500  Total Billable Time: 30 minutes    Precautions: Standard: FWB    Subjective      Pt states feeling well w/ no c/o pn in L knee.     Response to previous treatment:no adverse effects   Functional change: no change    Pain: 0/10  Location: left knee      Objective     Meghan received therapeutic exercises to develop strength, ROM, flexibility and core stabilization for 30 minutes including:  - Bike x 5 min to increase blood flow   Mini squats 3 x 10 TRX  Step ups 6" 30 x   Step downs 3" 3 x 10   - Resisted side step BTB 1 lap   - Vector lunges 3 x 10  - SL bridges x 15 B   - RDL x 15         Assessment     Pt showed improved balance and strength during therex.  Pt had no adverse effects from tx.    Meghan is progressing well towards her goals.   Pt prognosis is Excellent.     Pt will continue to benefit from skilled outpatient physical therapy to address the deficits listed in the problem list box on initial evaluation, provide pt/family education and to maximize pt's level of independence in the home and community environment.     Pt's spiritual, cultural and educational needs considered and pt agreeable to plan of care and goals.        Plan     Pt will make today her last appointment      Vicente Gill PTA   "

## 2020-12-13 RX ORDER — ACETAMINOPHEN AND CODEINE PHOSPHATE 120; 12 MG/5ML; MG/5ML
SOLUTION ORAL
COMMUNITY
Start: 2020-09-16 | End: 2020-12-14

## 2020-12-13 RX ORDER — INFLUENZA A VIRUS A/NEBRASKA/14/2019 (H1N1) ANTIGEN (MDCK CELL DERIVED, PROPIOLACTONE INACTIVATED), INFLUENZA A VIRUS A/DELAWARE/39/2019 (H3N2) ANTIGEN (MDCK CELL DERIVED, PROPIOLACTONE INACTIVATED), INFLUENZA B VIRUS B/SINGAPORE/INFTT-16-0610/2016 ANTIGEN (MDCK CELL DERIVED, PROPIOLACTONE INACTIVATED), INFLUENZA B VIRUS B/DARWIN/7/2019 ANTIGEN (MDCK CELL DERIVED, PROPIOLACTONE INACTIVATED) 15; 15; 15; 15 UG/.5ML; UG/.5ML; UG/.5ML; UG/.5ML
INJECTION, SUSPENSION INTRAMUSCULAR
COMMUNITY
Start: 2020-10-16 | End: 2020-10-16

## 2020-12-13 NOTE — PROGRESS NOTES
Subjective:       Patient ID: Meghan Fontana is a 30 y.o. female who returns for her first visit with me and nearly 3 years.  She is 10 months s/p delivery of well female infant; stopped breastfeeding 2 months ago; menses have returned.    Chief Complaint: Annual Exam    HPI   She presents to the office today requesting a routine periodic health examination.      Patient Active Problem List   Diagnosis    Oral contraceptive use    History of recurrent UTI (urinary tract infection)    Left knee pain       Past Surgical History:   Procedure Laterality Date    CERVICAL BIOPSY  W/ LOOP ELECTRODE EXCISION  08/2017    OVARIAN CYST SURGERY Bilateral 02/2019         Current Outpatient Medications:     OCELLA 3-0.03 mg per tablet, Take 1 tablet by mouth once daily., Disp: , Rfl: 11    Review of patient's allergies indicates:   Allergen Reactions    Penicillins     Latex Rash       Family History   Problem Relation Age of Onset    No Known Problems Mother     Stroke Father         hemorhagic    Hypertension Father     Epilepsy Sister        Social History     Socioeconomic History    Marital status:      Spouse name: Not on file    Number of children: 0    Years of education: Not on file    Highest education level: Not on file   Occupational History    Occupation: Pharmacist     Comment: [employer]   Social Needs    Financial resource strain: Not hard at all    Food insecurity     Worry: Never true     Inability: Never true    Transportation needs     Medical: No     Non-medical: No   Tobacco Use    Smoking status: Never Smoker    Smokeless tobacco: Never Used   Substance and Sexual Activity    Alcohol use: Yes     Alcohol/week: 6.0 standard drinks     Types: 2 Glasses of wine, 4 Cans of beer per week     Frequency: 2-4 times a month     Drinks per session: 1 or 2     Binge frequency: Less than monthly    Drug use: No    Sexual activity: Yes   Lifestyle    Physical activity     Days per week: 0  days     Minutes per session: 0 min    Stress: Only a little   Relationships    Social connections     Talks on phone: More than three times a week     Gets together: Twice a week     Attends [employer] service: Not on file     Active member of club or organization: Yes     Attends meetings of clubs or organizations: More than 4 times per year     Relationship status:    Other Topics Concern    Not on file   Social History Narrative    She is satisfied with weight.    Rates diet as fair.    She does drink at least 1/2 gallon water daily.    She drinks 2 coffee/tea/caffeine-containing soft drinks daily.    Total sleep time at night is 7 hours.    She works 41 hours per week.    She does wear seat belts.    Hobbies include none.       OB History        1    Para   1    Term   1       0    AB   0    Living   0       SAB   0    TAB   0    Ectopic   0    Multiple   0    Live Births   0           Obstetric Comments   Menarche age 10.   Menses normal and regular.  History of abnormal PAP smear: YES: s/p LEEP 2017 2/t dysplasia.  History of sexually transmitted disease:  YES: ?HPV                  Patient Care Team:  Marcos Fuentes Jr., MD as PCP - General (Family Medicine)  Duncan Boyd MD as Obstetrician (Obstetrics)  Nesha Nowak MA as Care Coordinator    Health Maintenance   Topic Date Due    Hepatitis C Screening  1990    TETANUS VACCINE  2029    Lipid Panel  Completed          Review of Systems   Constitutional: Positive for unexpected weight change (20+ lbs lighter than pre-pregnancy weight). Negative for activity change and fatigue.   HENT: Negative for ear discharge, ear pain, hearing loss, rhinorrhea, tinnitus, trouble swallowing and voice change.    Eyes: Negative for discharge and visual disturbance.   Respiratory: Negative for cough, chest tightness, shortness of breath and wheezing.    Cardiovascular: Negative for chest pain, palpitations and leg swelling.  "  Gastrointestinal: Negative for abdominal pain, blood in stool, constipation, diarrhea, nausea and vomiting.   Endocrine: Positive for cold intolerance. Negative for polydipsia and polyuria.   Genitourinary: Negative for difficulty urinating, dyspareunia, dysuria, frequency, hematuria and menstrual problem.   Musculoskeletal: Negative for arthralgias, back pain, joint swelling, myalgias and neck pain.   Skin: Negative for rash.   Neurological: Negative for dizziness, weakness, light-headedness and headaches.   Hematological: Does not bruise/bleed easily.   Psychiatric/Behavioral: Negative for confusion, dysphoric mood and sleep disturbance. The patient is not nervous/anxious.        Objective:      /82   Pulse 94   Ht 5' 7" (1.702 m)   Wt 67.1 kg (148 lb)   LMP 12/09/2020   SpO2 98%   BMI 23.18 kg/m²     Physical Exam  Vitals signs reviewed.   Constitutional:       Appearance: She is well-developed.   HENT:      Head: Normocephalic and atraumatic.      Nose: Nose normal.   Eyes:      General: No scleral icterus.     Conjunctiva/sclera: Conjunctivae normal.   Neck:      Musculoskeletal: Neck supple.      Thyroid: No thyromegaly.      Vascular: No carotid bruit or JVD.   Cardiovascular:      Rate and Rhythm: Normal rate and regular rhythm.      Pulses: Normal pulses.      Heart sounds: Normal heart sounds. No murmur. No friction rub. No gallop.    Pulmonary:      Effort: Pulmonary effort is normal.      Breath sounds: Normal breath sounds. No wheezing, rhonchi or rales.   Abdominal:      General: Bowel sounds are normal. There is no distension.      Palpations: Abdomen is soft. There is no hepatomegaly, splenomegaly or mass.      Tenderness: There is no abdominal tenderness.   Musculoskeletal: Normal range of motion.         General: No tenderness.   Lymphadenopathy:      Cervical: No cervical adenopathy.   Skin:     General: Skin is warm and dry.   Neurological:      Mental Status: She is alert and " "oriented to person, place, and time.      Cranial Nerves: No cranial nerve deficit.      Sensory: No sensory deficit.      Deep Tendon Reflexes: Reflexes are normal and symmetric.   Psychiatric:         Speech: Speech normal.         Behavior: Behavior is cooperative.           Assessment:       1. Routine general medical examination at a health care facility    2. Need for hepatitis C screening test    3. Laboratory exam ordered as part of routine general medical examination          Plan:       Discussed HIV and Hepatitis C screening.  Offered Tdap vaccine.  Obtain copy of patient's most recent Pap smear/cervical cancer screening.  Remainder of Health Maintenance reviewed - up to date.    Labs (see Orders)     Orders Placed This Encounter    CBC Auto Differential    Comprehensive Metabolic Panel    Lipid Panel    TSH    T4, Free    Hepatitis C Antibody     Further recommendations to follow after above.  RTC 6-12 months.      "This note will not be shared with the patient."    "

## 2020-12-14 ENCOUNTER — LAB VISIT (OUTPATIENT)
Dept: LAB | Facility: HOSPITAL | Age: 30
End: 2020-12-14
Attending: FAMILY MEDICINE
Payer: COMMERCIAL

## 2020-12-14 ENCOUNTER — OFFICE VISIT (OUTPATIENT)
Dept: FAMILY MEDICINE | Facility: CLINIC | Age: 30
End: 2020-12-14
Attending: FAMILY MEDICINE
Payer: COMMERCIAL

## 2020-12-14 VITALS
WEIGHT: 148 LBS | OXYGEN SATURATION: 98 % | HEIGHT: 67 IN | BODY MASS INDEX: 23.23 KG/M2 | DIASTOLIC BLOOD PRESSURE: 82 MMHG | SYSTOLIC BLOOD PRESSURE: 122 MMHG | HEART RATE: 94 BPM

## 2020-12-14 DIAGNOSIS — Z11.59 NEED FOR HEPATITIS C SCREENING TEST: ICD-10-CM

## 2020-12-14 DIAGNOSIS — Z00.00 LABORATORY EXAM ORDERED AS PART OF ROUTINE GENERAL MEDICAL EXAMINATION: ICD-10-CM

## 2020-12-14 DIAGNOSIS — Z00.00 ROUTINE GENERAL MEDICAL EXAMINATION AT A HEALTH CARE FACILITY: Primary | ICD-10-CM

## 2020-12-14 LAB
ALBUMIN SERPL BCP-MCNC: 3.9 G/DL (ref 3.5–5.2)
ALP SERPL-CCNC: 51 U/L (ref 55–135)
ALT SERPL W/O P-5'-P-CCNC: 16 U/L (ref 10–44)
ANION GAP SERPL CALC-SCNC: 9 MMOL/L (ref 8–16)
AST SERPL-CCNC: 19 U/L (ref 10–40)
BASOPHILS # BLD AUTO: 0.02 K/UL (ref 0–0.2)
BASOPHILS NFR BLD: 0.4 % (ref 0–1.9)
BILIRUB SERPL-MCNC: 0.4 MG/DL (ref 0.1–1)
BUN SERPL-MCNC: 8 MG/DL (ref 6–20)
CALCIUM SERPL-MCNC: 9.2 MG/DL (ref 8.7–10.5)
CHLORIDE SERPL-SCNC: 106 MMOL/L (ref 95–110)
CHOLEST SERPL-MCNC: 182 MG/DL (ref 120–199)
CHOLEST/HDLC SERPL: 2.7 {RATIO} (ref 2–5)
CO2 SERPL-SCNC: 25 MMOL/L (ref 23–29)
CREAT SERPL-MCNC: 1 MG/DL (ref 0.5–1.4)
DIFFERENTIAL METHOD: NORMAL
EOSINOPHIL # BLD AUTO: 0 K/UL (ref 0–0.5)
EOSINOPHIL NFR BLD: 0.4 % (ref 0–8)
ERYTHROCYTE [DISTWIDTH] IN BLOOD BY AUTOMATED COUNT: 12.1 % (ref 11.5–14.5)
EST. GFR  (AFRICAN AMERICAN): >60 ML/MIN/1.73 M^2
EST. GFR  (NON AFRICAN AMERICAN): >60 ML/MIN/1.73 M^2
GLUCOSE SERPL-MCNC: 112 MG/DL (ref 70–110)
HCT VFR BLD AUTO: 42.6 % (ref 37–48.5)
HDLC SERPL-MCNC: 68 MG/DL (ref 40–75)
HDLC SERPL: 37.4 % (ref 20–50)
HGB BLD-MCNC: 14.4 G/DL (ref 12–16)
IMM GRANULOCYTES # BLD AUTO: 0.01 K/UL (ref 0–0.04)
IMM GRANULOCYTES NFR BLD AUTO: 0.2 % (ref 0–0.5)
LDLC SERPL CALC-MCNC: 94.6 MG/DL (ref 63–159)
LYMPHOCYTES # BLD AUTO: 2.2 K/UL (ref 1–4.8)
LYMPHOCYTES NFR BLD: 41.5 % (ref 18–48)
MCH RBC QN AUTO: 29.1 PG (ref 27–31)
MCHC RBC AUTO-ENTMCNC: 33.8 G/DL (ref 32–36)
MCV RBC AUTO: 86 FL (ref 82–98)
MONOCYTES # BLD AUTO: 0.3 K/UL (ref 0.3–1)
MONOCYTES NFR BLD: 5.7 % (ref 4–15)
NEUTROPHILS # BLD AUTO: 2.8 K/UL (ref 1.8–7.7)
NEUTROPHILS NFR BLD: 51.8 % (ref 38–73)
NONHDLC SERPL-MCNC: 114 MG/DL
NRBC BLD-RTO: 0 /100 WBC
PLATELET # BLD AUTO: 331 K/UL (ref 150–350)
PMV BLD AUTO: 10 FL (ref 9.2–12.9)
POTASSIUM SERPL-SCNC: 4.3 MMOL/L (ref 3.5–5.1)
PROT SERPL-MCNC: 7.4 G/DL (ref 6–8.4)
RBC # BLD AUTO: 4.94 M/UL (ref 4–5.4)
SODIUM SERPL-SCNC: 140 MMOL/L (ref 136–145)
T4 FREE SERPL-MCNC: 1.08 NG/DL (ref 0.71–1.51)
TRIGL SERPL-MCNC: 97 MG/DL (ref 30–150)
TSH SERPL DL<=0.005 MIU/L-ACNC: 1.17 UIU/ML (ref 0.4–4)
WBC # BLD AUTO: 5.4 K/UL (ref 3.9–12.7)

## 2020-12-14 PROCEDURE — 85025 COMPLETE CBC W/AUTO DIFF WBC: CPT

## 2020-12-14 PROCEDURE — 3008F BODY MASS INDEX DOCD: CPT | Mod: CPTII,S$GLB,, | Performed by: FAMILY MEDICINE

## 2020-12-14 PROCEDURE — 86803 HEPATITIS C AB TEST: CPT

## 2020-12-14 PROCEDURE — 99999 PR PBB SHADOW E&M-EST. PATIENT-LVL III: CPT | Mod: PBBFAC,,, | Performed by: FAMILY MEDICINE

## 2020-12-14 PROCEDURE — 99999 PR PBB SHADOW E&M-EST. PATIENT-LVL III: ICD-10-PCS | Mod: PBBFAC,,, | Performed by: FAMILY MEDICINE

## 2020-12-14 PROCEDURE — 80061 LIPID PANEL: CPT

## 2020-12-14 PROCEDURE — 80053 COMPREHEN METABOLIC PANEL: CPT

## 2020-12-14 PROCEDURE — 99395 PR PREVENTIVE VISIT,EST,18-39: ICD-10-PCS | Mod: S$GLB,,, | Performed by: FAMILY MEDICINE

## 2020-12-14 PROCEDURE — 84439 ASSAY OF FREE THYROXINE: CPT

## 2020-12-14 PROCEDURE — 3008F PR BODY MASS INDEX (BMI) DOCUMENTED: ICD-10-PCS | Mod: CPTII,S$GLB,, | Performed by: FAMILY MEDICINE

## 2020-12-14 PROCEDURE — 84443 ASSAY THYROID STIM HORMONE: CPT

## 2020-12-14 PROCEDURE — 1126F PR PAIN SEVERITY QUANTIFIED, NO PAIN PRESENT: ICD-10-PCS | Mod: S$GLB,,, | Performed by: FAMILY MEDICINE

## 2020-12-14 PROCEDURE — 1126F AMNT PAIN NOTED NONE PRSNT: CPT | Mod: S$GLB,,, | Performed by: FAMILY MEDICINE

## 2020-12-14 PROCEDURE — 99395 PREV VISIT EST AGE 18-39: CPT | Mod: S$GLB,,, | Performed by: FAMILY MEDICINE

## 2020-12-14 PROCEDURE — 36415 COLL VENOUS BLD VENIPUNCTURE: CPT | Mod: PO

## 2020-12-15 ENCOUNTER — PATIENT MESSAGE (OUTPATIENT)
Dept: FAMILY MEDICINE | Facility: CLINIC | Age: 30
End: 2020-12-15

## 2020-12-15 LAB — HCV AB SERPL QL IA: NEGATIVE

## 2020-12-17 ENCOUNTER — CLINICAL SUPPORT (OUTPATIENT)
Dept: URGENT CARE | Facility: CLINIC | Age: 30
End: 2020-12-17
Payer: COMMERCIAL

## 2020-12-17 DIAGNOSIS — Z20.822 EXPOSURE TO COVID-19 VIRUS: Primary | ICD-10-CM

## 2020-12-17 LAB
CTP QC/QA: YES
SARS-COV-2 RDRP RESP QL NAA+PROBE: NEGATIVE

## 2020-12-17 PROCEDURE — 99211 PR OFFICE/OUTPT VISIT, EST, LEVL I: ICD-10-PCS | Mod: S$GLB,CS,, | Performed by: INTERNAL MEDICINE

## 2020-12-17 PROCEDURE — U0002 COVID-19 LAB TEST NON-CDC: HCPCS | Mod: QW,S$GLB,, | Performed by: INTERNAL MEDICINE

## 2020-12-17 PROCEDURE — U0002: ICD-10-PCS | Mod: QW,S$GLB,, | Performed by: INTERNAL MEDICINE

## 2020-12-17 PROCEDURE — 99211 OFF/OP EST MAY X REQ PHY/QHP: CPT | Mod: S$GLB,CS,, | Performed by: INTERNAL MEDICINE

## 2021-04-16 ENCOUNTER — PATIENT MESSAGE (OUTPATIENT)
Dept: RESEARCH | Facility: HOSPITAL | Age: 31
End: 2021-04-16

## 2021-07-15 ENCOUNTER — PATIENT MESSAGE (OUTPATIENT)
Dept: INTERNAL MEDICINE | Facility: CLINIC | Age: 31
End: 2021-07-15

## 2022-03-17 ENCOUNTER — OFFICE VISIT (OUTPATIENT)
Dept: ENDOCRINOLOGY | Facility: CLINIC | Age: 32
End: 2022-03-17
Payer: COMMERCIAL

## 2022-03-17 VITALS
BODY MASS INDEX: 25.14 KG/M2 | DIASTOLIC BLOOD PRESSURE: 86 MMHG | RESPIRATION RATE: 18 BRPM | SYSTOLIC BLOOD PRESSURE: 120 MMHG | OXYGEN SATURATION: 98 % | WEIGHT: 160.19 LBS | HEART RATE: 69 BPM | HEIGHT: 67 IN

## 2022-03-17 DIAGNOSIS — N92.6 IRREGULAR PERIODS/MENSTRUAL CYCLES: ICD-10-CM

## 2022-03-17 PROCEDURE — 3079F DIAST BP 80-89 MM HG: CPT | Mod: CPTII,S$GLB,, | Performed by: INTERNAL MEDICINE

## 2022-03-17 PROCEDURE — 99204 OFFICE O/P NEW MOD 45 MIN: CPT | Mod: S$GLB,,, | Performed by: INTERNAL MEDICINE

## 2022-03-17 PROCEDURE — 3074F PR MOST RECENT SYSTOLIC BLOOD PRESSURE < 130 MM HG: ICD-10-PCS | Mod: CPTII,S$GLB,, | Performed by: INTERNAL MEDICINE

## 2022-03-17 PROCEDURE — 99999 PR PBB SHADOW E&M-EST. PATIENT-LVL IV: CPT | Mod: PBBFAC,,, | Performed by: INTERNAL MEDICINE

## 2022-03-17 PROCEDURE — 3008F PR BODY MASS INDEX (BMI) DOCUMENTED: ICD-10-PCS | Mod: CPTII,S$GLB,, | Performed by: INTERNAL MEDICINE

## 2022-03-17 PROCEDURE — 99204 PR OFFICE/OUTPT VISIT, NEW, LEVL IV, 45-59 MIN: ICD-10-PCS | Mod: S$GLB,,, | Performed by: INTERNAL MEDICINE

## 2022-03-17 PROCEDURE — 1160F RVW MEDS BY RX/DR IN RCRD: CPT | Mod: CPTII,S$GLB,, | Performed by: INTERNAL MEDICINE

## 2022-03-17 PROCEDURE — 3079F PR MOST RECENT DIASTOLIC BLOOD PRESSURE 80-89 MM HG: ICD-10-PCS | Mod: CPTII,S$GLB,, | Performed by: INTERNAL MEDICINE

## 2022-03-17 PROCEDURE — 3008F BODY MASS INDEX DOCD: CPT | Mod: CPTII,S$GLB,, | Performed by: INTERNAL MEDICINE

## 2022-03-17 PROCEDURE — 3074F SYST BP LT 130 MM HG: CPT | Mod: CPTII,S$GLB,, | Performed by: INTERNAL MEDICINE

## 2022-03-17 PROCEDURE — 1159F PR MEDICATION LIST DOCUMENTED IN MEDICAL RECORD: ICD-10-PCS | Mod: CPTII,S$GLB,, | Performed by: INTERNAL MEDICINE

## 2022-03-17 PROCEDURE — 99999 PR PBB SHADOW E&M-EST. PATIENT-LVL IV: ICD-10-PCS | Mod: PBBFAC,,, | Performed by: INTERNAL MEDICINE

## 2022-03-17 PROCEDURE — 1160F PR REVIEW ALL MEDS BY PRESCRIBER/CLIN PHARMACIST DOCUMENTED: ICD-10-PCS | Mod: CPTII,S$GLB,, | Performed by: INTERNAL MEDICINE

## 2022-03-17 PROCEDURE — 1159F MED LIST DOCD IN RCRD: CPT | Mod: CPTII,S$GLB,, | Performed by: INTERNAL MEDICINE

## 2022-03-17 NOTE — PATIENT INSTRUCTIONS
Nutrition goals:  Goal weight loss 5% body weight ( 8 lbs)    Weigh yourself weekly    Simple plan: avoid anything made with flour or sugar for six weeks.     No snacking.     Increase vegetables, lean proteins and limit carbohydrates.     Choose high fiber carbohydrates(beans is a good example). A complex carbohydrate is a carbohydrate that has more than 3 - 5 grams of fiber per serving. Please read the nutrition labels.     Drink plenty of water and avoid drinks with sugar such as regular sodas.

## 2022-03-17 NOTE — ASSESSMENT & PLAN NOTE
1) irregular ovulation  2) no hirustism/acne --> needs labs to exclude biochemical hyperandrogenism   3) no US demonstrating PCOM    Secondary evaluation:   Normal prolactin, and TSH    Would like to have OGTT  Consider testosterone and 17 OHP    MAINE IR = FPI*FPG/405  < 1 normal  2 - 3 mild  > 3 mod- severe    = 1.6

## 2022-03-17 NOTE — PROGRESS NOTES
Subjective:      Patient ID: Meghan Fontana is a 31 y.o. female.    Chief Complaint:  Polycystic Ovary Syndrome      History of Present Illness    Meghan Fontana is here for evaluation and management of irregular menstrual cycles.  Today reports the following:  Pelvic pain cramping, hair loss and weight gain (10 lbs) off OCPs.   [facility]   Menstrual cycles are 45 dys long.     Menarchal history:  Menarche occurred at 11, regular cycles   Started OCPs 15 - 15 due to heavy bleeding     Stopped OCPs 11/2021 to achieve a second pregnancy.   LMP 12/27  Miscarriage 2/2022    In the past has seen fertility institute:   Insulin 7.6  Glucose 90  Prolactin normal   TSH normal     Hyperandrogenism:  Denies acne. Denies hirsutism.     Ultrasound of the ovaries in the past demonstrates 4 cm dermoid cyst.      Metabolic risk:  Denies hyperlipidemia  Denies history of hypertension or elevated blood pressure.   Denies history of fatty liver  Does not snore at nights, denies obstructive sleep apnea    + headaches Denies any history of galactorrhe or recent changes to vision.   Denies striae, plethora, proximal muscle weakness. Denies eating disorders.     Review of Systems  Denies recent illnes     Objective:   Physical Exam  Constitutional:       General: She is not in acute distress.     Appearance: She is well-developed.   Eyes:      General: No scleral icterus.     Conjunctiva/sclera: Conjunctivae normal.      Pupils: Pupils are equal, round, and reactive to light.      Comments: No proptosis.    Neck:      Thyroid: No thyromegaly.      Trachea: No tracheal deviation.   Cardiovascular:      Rate and Rhythm: Normal rate.   Pulmonary:      Effort: Pulmonary effort is normal.      Breath sounds: Normal breath sounds.   Musculoskeletal:      Cervical back: Normal range of motion and neck supple.   Lymphadenopathy:      Cervical: No cervical adenopathy.   Skin:     General: Skin is  "warm and dry.      Findings: No rash.      Comments: No striae, skin tags, bruising  No acanthosis   Neurological:      Mental Status: She is alert and oriented to person, place, and time.      Deep Tendon Reflexes: Reflexes are normal and symmetric.      Comments: No tremor.       Vitals:    03/17/22 1457   BP: 120/86   BP Location: Right arm   Patient Position: Sitting   BP Method: Small (Manual)   Pulse: 69   Resp: 18   SpO2: 98%   Weight: 72.7 kg (160 lb 2.6 oz)   Height: 5' 7" (1.702 m)       BP Readings from Last 3 Encounters:   03/17/22 120/86   12/14/20 122/82   04/08/19 123/83     Wt Readings from Last 1 Encounters:   03/17/22 1457 72.7 kg (160 lb 2.6 oz)         Body mass index is 25.09 kg/m².    Lab Review:   No results found for: HGBA1C  Lab Results   Component Value Date    CHOL 182 12/14/2020    HDL 68 12/14/2020    LDLCALC 94.6 12/14/2020    TRIG 97 12/14/2020    CHOLHDL 37.4 12/14/2020     Lab Results   Component Value Date     12/14/2020    K 4.3 12/14/2020     12/14/2020    CO2 25 12/14/2020     (H) 12/14/2020    BUN 8 12/14/2020    CREATININE 1.0 12/14/2020    CALCIUM 9.2 12/14/2020    PROT 7.4 12/14/2020    ALBUMIN 3.9 12/14/2020    BILITOT 0.4 12/14/2020    ALKPHOS 51 (L) 12/14/2020    AST 19 12/14/2020    ALT 16 12/14/2020    ANIONGAP 9 12/14/2020    ESTGFRAFRICA >60.0 12/14/2020    EGFRNONAA >60.0 12/14/2020    TSH 1.168 12/14/2020         Assessment and Plan     Irregular periods/menstrual cycles    MAINE IR = FPI*FPG/405  < 1 normal  2 - 3 mild  > 3 mod- severe    = 1.6          "

## 2022-03-30 ENCOUNTER — PATIENT MESSAGE (OUTPATIENT)
Dept: ENDOCRINOLOGY | Facility: CLINIC | Age: 32
End: 2022-03-30
Payer: COMMERCIAL

## 2022-04-08 ENCOUNTER — LAB VISIT (OUTPATIENT)
Dept: LAB | Facility: HOSPITAL | Age: 32
End: 2022-04-08
Attending: INTERNAL MEDICINE
Payer: COMMERCIAL

## 2022-04-08 DIAGNOSIS — N92.6 IRREGULAR PERIODS/MENSTRUAL CYCLES: ICD-10-CM

## 2022-04-08 LAB
GLUCOSE SERPL-MCNC: 78 MG/DL
GLUCOSE SERPL-MCNC: 87 MG/DL (ref 70–110)
GLUCOSE SERPL-MCNC: 96 MG/DL
INSULIN COLLECTION INTERVAL: 1
INSULIN SERPL-ACNC: 4.6 UU/ML

## 2022-04-08 PROCEDURE — 83498 ASY HYDROXYPROGESTERONE 17-D: CPT | Performed by: INTERNAL MEDICINE

## 2022-04-08 PROCEDURE — 82951 GLUCOSE TOLERANCE TEST (GTT): CPT | Performed by: INTERNAL MEDICINE

## 2022-04-08 PROCEDURE — 83525 ASSAY OF INSULIN: CPT | Performed by: INTERNAL MEDICINE

## 2022-04-08 PROCEDURE — 84402 ASSAY OF FREE TESTOSTERONE: CPT | Performed by: INTERNAL MEDICINE

## 2022-04-08 PROCEDURE — 36415 COLL VENOUS BLD VENIPUNCTURE: CPT | Performed by: INTERNAL MEDICINE

## 2022-04-15 LAB
17OHP SERPL-MCNC: <31 NG/DL (ref 35–413)
TESTOST FREE SERPL-MCNC: <0.4 PG/ML

## 2022-05-16 ENCOUNTER — PATIENT MESSAGE (OUTPATIENT)
Dept: ENDOCRINOLOGY | Facility: CLINIC | Age: 32
End: 2022-05-16
Payer: COMMERCIAL

## 2022-05-16 NOTE — TELEPHONE ENCOUNTER
Results for orders placed or performed in visit on 04/08/22   Glucose Tolerance 2 Hour   Result Value Ref Range    Gluc Fast 87 70 - 110 mg/dL    Gluc 1 HR 78 mg/dL    Gluc 2 HR 96 mg/dL   Insulin, random   Result Value Ref Range    Insulin 4.6 <25.0 uU/mL    Insulin Collection Interval 1    Testosterone, free   Result Value Ref Range    Testosterone, Free <0.4 pg/mL   17-Hydroxyprogesterone   Result Value Ref Range    17-Hydroxyprogesterone <31 (L) 35 - 413 ng/dL     MAINE IR = FPI*FPG/405  < 1 normal  2 - 3 mild  > 3 mod- severe    = <1

## 2025-06-16 ENCOUNTER — TELEPHONE (OUTPATIENT)
Dept: INTERNAL MEDICINE | Facility: CLINIC | Age: 35
End: 2025-06-16
Payer: COMMERCIAL

## 2025-06-17 ENCOUNTER — OFFICE VISIT (OUTPATIENT)
Dept: INTERNAL MEDICINE | Facility: CLINIC | Age: 35
End: 2025-06-17
Payer: COMMERCIAL

## 2025-06-17 VITALS
WEIGHT: 169.31 LBS | BODY MASS INDEX: 26.57 KG/M2 | HEIGHT: 67 IN | HEART RATE: 70 BPM | DIASTOLIC BLOOD PRESSURE: 68 MMHG | SYSTOLIC BLOOD PRESSURE: 112 MMHG | OXYGEN SATURATION: 99 %

## 2025-06-17 DIAGNOSIS — Z30.41 ORAL CONTRACEPTIVE USE: ICD-10-CM

## 2025-06-17 DIAGNOSIS — Z00.00 ROUTINE MEDICAL EXAM: Primary | ICD-10-CM

## 2025-06-17 DIAGNOSIS — L71.9 ROSACEA: ICD-10-CM

## 2025-06-17 DIAGNOSIS — E66.3 OVERWEIGHT (BMI 25.0-29.9): ICD-10-CM

## 2025-06-17 DIAGNOSIS — Z13.1 SCREENING FOR DIABETES MELLITUS: ICD-10-CM

## 2025-06-17 DIAGNOSIS — Z87.42 HISTORY OF ABNORMAL CERVICAL PAP SMEAR: ICD-10-CM

## 2025-06-17 PROBLEM — M25.562 LEFT KNEE PAIN: Status: RESOLVED | Noted: 2019-03-18 | Resolved: 2025-06-17

## 2025-06-17 PROBLEM — N92.6 IRREGULAR PERIODS/MENSTRUAL CYCLES: Status: RESOLVED | Noted: 2022-03-17 | Resolved: 2025-06-17

## 2025-06-17 PROBLEM — Z87.440 HISTORY OF RECURRENT UTI (URINARY TRACT INFECTION): Status: RESOLVED | Noted: 2017-12-27 | Resolved: 2025-06-17

## 2025-06-17 PROCEDURE — 1160F RVW MEDS BY RX/DR IN RCRD: CPT | Mod: CPTII,S$GLB,, | Performed by: NURSE PRACTITIONER

## 2025-06-17 PROCEDURE — 3008F BODY MASS INDEX DOCD: CPT | Mod: CPTII,S$GLB,, | Performed by: NURSE PRACTITIONER

## 2025-06-17 PROCEDURE — 1159F MED LIST DOCD IN RCRD: CPT | Mod: CPTII,S$GLB,, | Performed by: NURSE PRACTITIONER

## 2025-06-17 PROCEDURE — 3074F SYST BP LT 130 MM HG: CPT | Mod: CPTII,S$GLB,, | Performed by: NURSE PRACTITIONER

## 2025-06-17 PROCEDURE — 3078F DIAST BP <80 MM HG: CPT | Mod: CPTII,S$GLB,, | Performed by: NURSE PRACTITIONER

## 2025-06-17 PROCEDURE — 99395 PREV VISIT EST AGE 18-39: CPT | Mod: S$GLB,,, | Performed by: NURSE PRACTITIONER

## 2025-06-17 PROCEDURE — 99999 PR PBB SHADOW E&M-EST. PATIENT-LVL III: CPT | Mod: PBBFAC,,, | Performed by: NURSE PRACTITIONER

## 2025-06-17 RX ORDER — SEMAGLUTIDE 0.68 MG/ML
0.25 INJECTION, SOLUTION SUBCUTANEOUS
COMMUNITY

## 2025-06-17 RX ORDER — TRETINOIN 0.25 MG/G
CREAM TOPICAL NIGHTLY
Qty: 45 G | Refills: 5 | Status: SHIPPED | OUTPATIENT
Start: 2025-06-17

## 2025-06-17 RX ORDER — ONDANSETRON 4 MG/1
4 TABLET, ORALLY DISINTEGRATING ORAL EVERY 8 HOURS PRN
Qty: 30 TABLET | Refills: 5 | Status: SHIPPED | OUTPATIENT
Start: 2025-06-17 | End: 2025-08-16

## 2025-06-17 RX ORDER — DROSPIRENONE AND ETHINYL ESTRADIOL 0.03MG-3MG
1 KIT ORAL
COMMUNITY

## 2025-06-17 NOTE — PROGRESS NOTES
History of Present Illness   Meghan Fontana is a 34 y.o. woman with medical history as listed below who presents today for routine physical exam and to establish care. She did not have labs prior to our visit. She is taking medications as prescribed without perceived SE. Complaints today: none. Pertinent negatives as listed in ROS. We will address HM today.      No past medical history on file.    Past Surgical History:   Procedure Laterality Date    CERVICAL BIOPSY  W/ LOOP ELECTRODE EXCISION  08/2017    OVARIAN CYST SURGERY Bilateral 02/2019       Social History     Socioeconomic History    Marital status:     Number of children: 0   Occupational History    Occupation: Pharmacist     Comment: [employer]   Tobacco Use    Smoking status: Never    Smokeless tobacco: Never   Substance and Sexual Activity    Alcohol use: Yes     Alcohol/week: 6.0 standard drinks of alcohol     Types: 2 Glasses of wine, 4 Cans of beer per week    Drug use: No    Sexual activity: Yes   Social History Narrative    She is satisfied with weight.    Rates diet as fair.    She does drink at least 1/2 gallon water daily.    She drinks 2 coffee/tea/caffeine-containing soft drinks daily.    Total sleep time at night is 7 hours.    She works 41 hours per week.    She does wear seat belts.    Hobbies include none.     Social Drivers of Health     Financial Resource Strain: Low Risk  (6/16/2025)    Overall Financial Resource Strain (CARDIA)     Difficulty of Paying Living Expenses: Not hard at all   Food Insecurity: No Food Insecurity (6/16/2025)    Hunger Vital Sign     Worried About Running Out of Food in the Last Year: Never true     Ran Out of Food in the Last Year: Never true   Transportation Needs: No Transportation Needs (6/16/2025)    PRAPARE - Transportation     Lack of Transportation (Medical): No     Lack of Transportation (Non-Medical): No   Physical Activity: Insufficiently Active (6/16/2025)    Exercise Vital Sign     Days of  "Exercise per Week: 4 days     Minutes of Exercise per Session: 30 min   Stress: No Stress Concern Present (6/16/2025)    [employer] - Occupational Stress Questionnaire     Feeling of Stress : Only a little   Housing Stability: Low Risk  (6/16/2025)    Housing Stability Vital Sign     Unable to Pay for Housing in the Last Year: No     Number of Times Moved in the Last Year: 0     Homeless in the Last Year: No       Family History   Problem Relation Name Age of Onset    No Known Problems Mother      Stroke Father          hemorhagic    Hypertension Father      Epilepsy Sister         Review of Systems  Review of Systems   Constitutional:  Negative for malaise/fatigue and weight loss.   HENT:  Negative for hearing loss and tinnitus.    Eyes:  Negative for blurred vision and double vision.   Respiratory:  Negative for shortness of breath and wheezing.    Cardiovascular:  Negative for chest pain, palpitations, orthopnea, claudication and leg swelling.   Gastrointestinal:  Negative for blood in stool and melena.   Genitourinary:  Negative for flank pain and hematuria.   Musculoskeletal:  Negative for back pain, joint pain and neck pain.   Skin:  Negative for itching and rash.   Neurological:  Negative for dizziness, loss of consciousness and headaches.   Endo/Heme/Allergies:  Negative for polydipsia.   Psychiatric/Behavioral:  Negative for depression. The patient is not nervous/anxious and does not have insomnia.      A complete review of systems was otherwise negative.    Physical Exam  /68   Pulse 70   Ht 5' 7" (1.702 m)   Wt 76.8 kg (169 lb 5 oz)   SpO2 99%   BMI 26.52 kg/m²   General appearance: alert, appears stated age, cooperative, and no distress  Head: Normocephalic, without obvious abnormality, atraumatic  Eyes: conjunctivae/corneas clear. PERRL, EOM's intact. Fundi benign.  Ears: normal TM's and external ear canals both ears  Nose: Nares normal. Septum midline. Mucosa " normal. No drainage or sinus tenderness.  Throat: lips, mucosa, and tongue normal; teeth and gums normal  Neck: no adenopathy, no carotid bruit, no JVD, supple, symmetrical, trachea midline, and thyroid not enlarged, symmetric, no tenderness/mass/nodules  Back: symmetric, no curvature. ROM normal. No CVA tenderness.  Lungs: clear to auscultation bilaterally  Heart: regular rate and rhythm, S1, S2 normal, no murmur, click, rub or gallop  Abdomen: soft, non-tender; bowel sounds normal; no masses,  no organomegaly  Extremities: extremities normal, atraumatic, no cyanosis or edema  Pulses: 2+ and symmetric  Skin: Skin color, texture, turgor normal. No rashes or lesions  Lymph nodes: Cervical, supraclavicular, and axillary nodes normal.  Neurologic: Grossly normal    Assessment/Plan  1. Routine medical exam  Age appropriate screening recommendations and HM were dicsussed and updated.  Discussed importance of heart healthy diet and exercise.  Diabetes screening ordered.  Return in one year for routine physical or sooner PRN.    2. Oral contraceptive use  Per OBGYN at [facility].  Doing well on OCP with regular menstrual cycles    3. Rosacea  Has tried other topicals in the past with best response to Tretinoin.  Refilled today.  -     tretinoin (RETIN-A) 0.025 % cream; Apply topically every evening.  Dispense: 45 g; Refill: 5    4. Overweight (BMI 25.0-29.9)  The patient is asked to make an attempt to improve diet and exercise patterns to aid in medical management of this problem. We specifically discussed cutting calorie intake by 500-1000 calories per day for a goal of a 1-2 pound weight loss per week and recommendations for a mostly plant based diet with limited red meats/refined grains/processed foods/added sugars.  Has start GLP-1 from outside provider. Discussed SE to monitor for and how to increase dosing.    5. History of abnormal cervical Pap smear  Followed by OBGYN at [facility].  Most recent colpo WNL.    6. Screening for  diabetes mellitus  Routine screening.  -     HEMOGLOBIN A1C; Future; Expected date: 06/17/2025    Other orders  -     ondansetron (ZOFRAN-ODT) 4 MG TbDL; Take 1 tablet (4 mg total) by mouth every 8 (eight) hours as needed (nausea).  Dispense: 30 tablet; Refill: 5    Patient has verbalized understanding and is in agreement with plan of care.    Follow up in about 1 year (around 6/17/2026) for routine physical.